# Patient Record
Sex: MALE | Race: WHITE | Employment: FULL TIME | URBAN - METROPOLITAN AREA
[De-identification: names, ages, dates, MRNs, and addresses within clinical notes are randomized per-mention and may not be internally consistent; named-entity substitution may affect disease eponyms.]

---

## 2017-05-30 DIAGNOSIS — J02.9 SORE THROAT: ICD-10-CM

## 2017-06-01 LAB
ORGANISM: ABNORMAL
THROAT CULTURE: ABNORMAL
THROAT CULTURE: ABNORMAL

## 2017-12-23 ENCOUNTER — OFFICE VISIT (OUTPATIENT)
Dept: PRIMARY CARE CLINIC | Age: 37
End: 2017-12-23

## 2017-12-23 VITALS
OXYGEN SATURATION: 99 % | TEMPERATURE: 97.3 F | WEIGHT: 212 LBS | BODY MASS INDEX: 26.36 KG/M2 | DIASTOLIC BLOOD PRESSURE: 72 MMHG | HEIGHT: 75 IN | SYSTOLIC BLOOD PRESSURE: 120 MMHG | RESPIRATION RATE: 16 BRPM | HEART RATE: 65 BPM

## 2017-12-23 DIAGNOSIS — F43.9 STRESS: ICD-10-CM

## 2017-12-23 DIAGNOSIS — E78.5 HYPERLIPIDEMIA, UNSPECIFIED HYPERLIPIDEMIA TYPE: ICD-10-CM

## 2017-12-23 DIAGNOSIS — F32.A DEPRESSION, UNSPECIFIED DEPRESSION TYPE: ICD-10-CM

## 2017-12-23 DIAGNOSIS — J30.1 CHRONIC SEASONAL ALLERGIC RHINITIS DUE TO POLLEN: Primary | ICD-10-CM

## 2017-12-23 DIAGNOSIS — R53.83 FATIGUE, UNSPECIFIED TYPE: ICD-10-CM

## 2017-12-23 LAB
ALBUMIN SERPL-MCNC: 4.5 G/DL (ref 3.9–4.9)
ALP BLD-CCNC: 57 U/L (ref 35–104)
ALT SERPL-CCNC: 15 U/L (ref 0–41)
ANION GAP SERPL CALCULATED.3IONS-SCNC: 12 MEQ/L (ref 7–13)
ANISOCYTOSIS: 0
AST SERPL-CCNC: 18 U/L (ref 0–40)
ATYPICAL LYMPHOCYTE RELATIVE PERCENT: 4 %
BANDED NEUTROPHILS RELATIVE PERCENT: 5 %
BASOPHILS ABSOLUTE: 0.1 K/UL (ref 0–0.2)
BASOPHILS RELATIVE PERCENT: 2 %
BILIRUB SERPL-MCNC: 0.6 MG/DL (ref 0–1.2)
BUN BLDV-MCNC: 11 MG/DL (ref 6–20)
CALCIUM SERPL-MCNC: 9.4 MG/DL (ref 8.6–10.2)
CHLORIDE BLD-SCNC: 100 MEQ/L (ref 98–107)
CHOLESTEROL, TOTAL: 192 MG/DL (ref 0–199)
CO2: 29 MEQ/L (ref 22–29)
CREAT SERPL-MCNC: 0.78 MG/DL (ref 0.7–1.2)
EOSINOPHILS ABSOLUTE: 0.1 K/UL (ref 0–0.7)
EOSINOPHILS RELATIVE PERCENT: 3 %
GFR AFRICAN AMERICAN: >60
GFR NON-AFRICAN AMERICAN: >60
GLOBULIN: 2.5 G/DL (ref 2.3–3.5)
GLUCOSE BLD-MCNC: 88 MG/DL (ref 74–109)
HCT VFR BLD CALC: 45.8 % (ref 42–52)
HDLC SERPL-MCNC: 79 MG/DL (ref 40–59)
HEMOGLOBIN: 14.9 G/DL (ref 14–18)
HYPOCHROMIA: 0
LDL CHOLESTEROL CALCULATED: 101 MG/DL (ref 0–129)
LYMPHOCYTES ABSOLUTE: 1.6 K/UL (ref 1–4.8)
LYMPHOCYTES RELATIVE PERCENT: 42 %
MACROCYTES: 0
MAGNESIUM: 2.4 MG/DL (ref 1.7–2.3)
MCH RBC QN AUTO: 30.3 PG (ref 27–31.3)
MCHC RBC AUTO-ENTMCNC: 32.4 % (ref 33–37)
MCV RBC AUTO: 93.4 FL (ref 80–100)
MICROCYTES: 0
MONOCYTES ABSOLUTE: 0.4 K/UL (ref 0.2–0.8)
MONOCYTES RELATIVE PERCENT: 12.3 %
NEUTROPHILS ABSOLUTE: 1.3 K/UL (ref 1.4–6.5)
NEUTROPHILS RELATIVE PERCENT: 32 %
NUCLEATED RED BLOOD CELLS: 1 /100 WBC
PDW BLD-RTO: 13.7 % (ref 11.5–14.5)
PLATELET # BLD: 223 K/UL (ref 130–400)
PLATELET SLIDE REVIEW: NORMAL
POIKILOCYTES: 0
POLYCHROMASIA: 0
POTASSIUM SERPL-SCNC: 4.7 MEQ/L (ref 3.5–5.1)
PROMYELOCYTES PERCENT: 1 %
RBC # BLD: 4.91 M/UL (ref 4.7–6.1)
SODIUM BLD-SCNC: 141 MEQ/L (ref 132–144)
TOTAL PROTEIN: 7 G/DL (ref 6.4–8.1)
TRIGL SERPL-MCNC: 61 MG/DL (ref 0–200)
TSH SERPL DL<=0.05 MIU/L-ACNC: 2.71 UIU/ML (ref 0.27–4.2)
VITAMIN D 25-HYDROXY: 36.2 NG/ML (ref 30–100)
WBC # BLD: 3.4 K/UL (ref 4.8–10.8)

## 2017-12-23 PROCEDURE — 99214 OFFICE O/P EST MOD 30 MIN: CPT | Performed by: FAMILY MEDICINE

## 2017-12-23 RX ORDER — ALPRAZOLAM 0.25 MG/1
0.25 TABLET ORAL 3 TIMES DAILY PRN
Qty: 30 TABLET | Refills: 0 | Status: SHIPPED | OUTPATIENT
Start: 2017-12-23 | End: 2018-01-23

## 2017-12-23 RX ORDER — CITALOPRAM 20 MG/1
TABLET ORAL
Qty: 180 TABLET | Refills: 1 | Status: SHIPPED | OUTPATIENT
Start: 2017-12-23 | End: 2018-02-19 | Stop reason: SDUPTHER

## 2017-12-23 ASSESSMENT — ENCOUNTER SYMPTOMS
SHORTNESS OF BREATH: 0
VOMITING: 0
COUGH: 0
DIARRHEA: 0
WHEEZING: 0
NAUSEA: 0
CONSTIPATION: 0
ABDOMINAL PAIN: 0
SORE THROAT: 0
BACK PAIN: 0

## 2017-12-23 ASSESSMENT — PATIENT HEALTH QUESTIONNAIRE - PHQ9
2. FEELING DOWN, DEPRESSED OR HOPELESS: 1
SUM OF ALL RESPONSES TO PHQ9 QUESTIONS 1 & 2: 2
SUM OF ALL RESPONSES TO PHQ QUESTIONS 1-9: 2
1. LITTLE INTEREST OR PLEASURE IN DOING THINGS: 1

## 2017-12-25 LAB — TESTOSTERONE TOTAL-MALE: 774 NG/DL (ref 300–1080)

## 2017-12-30 ASSESSMENT — ENCOUNTER SYMPTOMS
EYE DISCHARGE: 0
EYE ITCHING: 0

## 2018-01-23 ENCOUNTER — OFFICE VISIT (OUTPATIENT)
Dept: PRIMARY CARE CLINIC | Age: 38
End: 2018-01-23
Payer: COMMERCIAL

## 2018-01-23 VITALS
HEIGHT: 75 IN | HEART RATE: 68 BPM | RESPIRATION RATE: 14 BRPM | SYSTOLIC BLOOD PRESSURE: 116 MMHG | BODY MASS INDEX: 27.73 KG/M2 | WEIGHT: 223 LBS | TEMPERATURE: 96.8 F | DIASTOLIC BLOOD PRESSURE: 80 MMHG

## 2018-01-23 DIAGNOSIS — F33.9 RECURRENT DEPRESSION (HCC): Primary | ICD-10-CM

## 2018-01-23 DIAGNOSIS — F43.9 STRESS: ICD-10-CM

## 2018-01-23 DIAGNOSIS — F41.9 ANXIETY: ICD-10-CM

## 2018-01-23 DIAGNOSIS — J30.1 ACUTE SEASONAL ALLERGIC RHINITIS DUE TO POLLEN: ICD-10-CM

## 2018-01-23 PROCEDURE — 99213 OFFICE O/P EST LOW 20 MIN: CPT | Performed by: FAMILY MEDICINE

## 2018-01-23 RX ORDER — ALPRAZOLAM 0.25 MG/1
0.25 TABLET ORAL 3 TIMES DAILY PRN
Qty: 30 TABLET | Refills: 0 | Status: SHIPPED | OUTPATIENT
Start: 2018-01-23 | End: 2018-02-22

## 2018-01-23 RX ORDER — BUPROPION HYDROCHLORIDE 150 MG/1
150 TABLET ORAL EVERY MORNING
Qty: 30 TABLET | Refills: 1 | Status: SHIPPED | OUTPATIENT
Start: 2018-01-23 | End: 2018-02-22 | Stop reason: SDUPTHER

## 2018-01-23 ASSESSMENT — ENCOUNTER SYMPTOMS
CHEST TIGHTNESS: 0
SHORTNESS OF BREATH: 0

## 2018-01-23 NOTE — PROGRESS NOTES
my presence, and it is both accurate and complete.  Electronically signed by: Flory Crocker MD    1/23/18 11:35 PM    Flory Crocker MD

## 2018-02-19 DIAGNOSIS — F32.A DEPRESSION, UNSPECIFIED DEPRESSION TYPE: ICD-10-CM

## 2018-02-19 RX ORDER — CITALOPRAM 20 MG/1
TABLET ORAL
Qty: 180 TABLET | Refills: 1 | Status: SHIPPED | OUTPATIENT
Start: 2018-02-19 | End: 2018-05-23 | Stop reason: SDUPTHER

## 2018-02-22 ENCOUNTER — OFFICE VISIT (OUTPATIENT)
Dept: PRIMARY CARE CLINIC | Age: 38
End: 2018-02-22
Payer: COMMERCIAL

## 2018-02-22 VITALS
OXYGEN SATURATION: 97 % | BODY MASS INDEX: 26.98 KG/M2 | RESPIRATION RATE: 16 BRPM | SYSTOLIC BLOOD PRESSURE: 116 MMHG | HEIGHT: 75 IN | WEIGHT: 217 LBS | HEART RATE: 62 BPM | TEMPERATURE: 97.2 F | DIASTOLIC BLOOD PRESSURE: 70 MMHG

## 2018-02-22 DIAGNOSIS — F41.9 ANXIETY: ICD-10-CM

## 2018-02-22 DIAGNOSIS — F33.9 RECURRENT DEPRESSION (HCC): ICD-10-CM

## 2018-02-22 DIAGNOSIS — J30.1 ACUTE SEASONAL ALLERGIC RHINITIS DUE TO POLLEN: Primary | ICD-10-CM

## 2018-02-22 PROCEDURE — 99213 OFFICE O/P EST LOW 20 MIN: CPT | Performed by: FAMILY MEDICINE

## 2018-02-22 RX ORDER — BUPROPION HYDROCHLORIDE 150 MG/1
150 TABLET ORAL EVERY MORNING
Qty: 90 TABLET | Refills: 1 | Status: SHIPPED | OUTPATIENT
Start: 2018-02-22 | End: 2018-05-23 | Stop reason: SDUPTHER

## 2018-02-22 ASSESSMENT — ENCOUNTER SYMPTOMS
SHORTNESS OF BREATH: 0
ABDOMINAL PAIN: 0
WHEEZING: 0

## 2018-02-22 NOTE — PROGRESS NOTES
SIGNS: are as recorded. GENERAL:  The patient appears well nourished and well-developed, and with normal affect. No acute respiratory distress. Alert and oriented times 3. No skin rashes. HEENT:  TMs normal bilaterally. Canals and ears normal. Pharynx is clear. Extraocular eye motions intact and pain free. Pupils reactive and equally round. Sclerae and conjunctivae clear, normocephalic and atraumatic. RESPIRATORY:  Clear and equal breath sounds with no acute respiratory distress. HEART: Regular rhythm without murmur, rub or gallop. ABDOMEN:  soft, nontender. No masses, guarding or rebound. Normoactive bowel sounds. NECK: No masses or adenopathy palpable. No bruits heard. No asymmetry visible. LOW BACK: No tenderness to palpation of inferior lumbar spine or either sacroiliac joint area. Assessment:     1. Acute seasonal allergic rhinitis due to pollen     2. Recurrent depression (HCC)  buPROPion (WELLBUTRIN XL) 150 MG extended release tablet   3. Anxiety         Plan:      No orders of the defined types were placed in this encounter. Orders Placed This Encounter   Medications    buPROPion (WELLBUTRIN XL) 150 MG extended release tablet     Sig: Take 1 tablet by mouth every morning     Dispense:  90 tablet     Refill:  1       Controlled Substances Monitoring:      Return in about 3 months (around 5/22/2018). I, Danny HUI, am scribing for and in the presence of Nanci Pastor MD. Electronically signed by : Charlene Lyon MD, personally performed the services described in this documentation, as scribed by Danny HUI   in my presence, and it is both accurate and complete.  Electronically signed by: Nanci Pastor MD    2/22/18 8:27 AM    Nanci Pastor MD

## 2018-05-23 ENCOUNTER — OFFICE VISIT (OUTPATIENT)
Dept: PRIMARY CARE CLINIC | Age: 38
End: 2018-05-23
Payer: COMMERCIAL

## 2018-05-23 VITALS
WEIGHT: 222 LBS | SYSTOLIC BLOOD PRESSURE: 108 MMHG | HEIGHT: 75 IN | RESPIRATION RATE: 16 BRPM | DIASTOLIC BLOOD PRESSURE: 72 MMHG | OXYGEN SATURATION: 97 % | HEART RATE: 71 BPM | TEMPERATURE: 97.3 F | BODY MASS INDEX: 27.6 KG/M2

## 2018-05-23 DIAGNOSIS — F32.A DEPRESSION, UNSPECIFIED DEPRESSION TYPE: ICD-10-CM

## 2018-05-23 DIAGNOSIS — J30.1 ACUTE SEASONAL ALLERGIC RHINITIS DUE TO POLLEN: Primary | ICD-10-CM

## 2018-05-23 DIAGNOSIS — F33.9 RECURRENT DEPRESSION (HCC): ICD-10-CM

## 2018-05-23 DIAGNOSIS — F41.9 ANXIETY: ICD-10-CM

## 2018-05-23 PROCEDURE — 99214 OFFICE O/P EST MOD 30 MIN: CPT | Performed by: FAMILY MEDICINE

## 2018-05-23 PROCEDURE — 96372 THER/PROPH/DIAG INJ SC/IM: CPT | Performed by: FAMILY MEDICINE

## 2018-05-23 RX ORDER — FEXOFENADINE HCL 180 MG/1
180 TABLET ORAL DAILY
Qty: 90 TABLET | Refills: 1 | Status: SHIPPED | OUTPATIENT
Start: 2018-05-23 | End: 2018-12-13 | Stop reason: SDUPTHER

## 2018-05-23 RX ORDER — CITALOPRAM 20 MG/1
TABLET ORAL
Qty: 180 TABLET | Refills: 1 | Status: SHIPPED | OUTPATIENT
Start: 2018-05-23 | End: 2018-12-13 | Stop reason: SDUPTHER

## 2018-05-23 RX ORDER — TRIAMCINOLONE ACETONIDE 40 MG/ML
80 INJECTION, SUSPENSION INTRA-ARTICULAR; INTRAMUSCULAR ONCE
Status: COMPLETED | OUTPATIENT
Start: 2018-05-23 | End: 2018-05-23

## 2018-05-23 RX ORDER — BUPROPION HYDROCHLORIDE 150 MG/1
150 TABLET ORAL EVERY MORNING
Qty: 90 TABLET | Refills: 1 | Status: SHIPPED | OUTPATIENT
Start: 2018-05-23 | End: 2018-12-13 | Stop reason: SDUPTHER

## 2018-05-23 RX ADMIN — TRIAMCINOLONE ACETONIDE 80 MG: 40 INJECTION, SUSPENSION INTRA-ARTICULAR; INTRAMUSCULAR at 09:08

## 2018-05-23 ASSESSMENT — ENCOUNTER SYMPTOMS
TROUBLE SWALLOWING: 0
SHORTNESS OF BREATH: 0
COUGH: 0
CHEST TIGHTNESS: 0
SINUS PAIN: 1
SORE THROAT: 0
RHINORRHEA: 1

## 2018-12-04 DIAGNOSIS — J30.1 ACUTE SEASONAL ALLERGIC RHINITIS DUE TO POLLEN: ICD-10-CM

## 2018-12-04 RX ORDER — FEXOFENADINE HCL 180 MG/1
TABLET ORAL
Qty: 90 TABLET | Refills: 1 | OUTPATIENT
Start: 2018-12-04

## 2018-12-13 ENCOUNTER — OFFICE VISIT (OUTPATIENT)
Dept: PRIMARY CARE CLINIC | Age: 38
End: 2018-12-13
Payer: COMMERCIAL

## 2018-12-13 VITALS
WEIGHT: 210 LBS | RESPIRATION RATE: 16 BRPM | HEIGHT: 75 IN | DIASTOLIC BLOOD PRESSURE: 70 MMHG | SYSTOLIC BLOOD PRESSURE: 110 MMHG | TEMPERATURE: 97.7 F | BODY MASS INDEX: 26.11 KG/M2 | HEART RATE: 66 BPM

## 2018-12-13 DIAGNOSIS — R53.83 FATIGUE, UNSPECIFIED TYPE: ICD-10-CM

## 2018-12-13 DIAGNOSIS — F33.9 RECURRENT DEPRESSION (HCC): ICD-10-CM

## 2018-12-13 DIAGNOSIS — J30.1 ACUTE SEASONAL ALLERGIC RHINITIS DUE TO POLLEN: ICD-10-CM

## 2018-12-13 DIAGNOSIS — R53.83 OTHER FATIGUE: ICD-10-CM

## 2018-12-13 DIAGNOSIS — E78.5 HYPERLIPIDEMIA, UNSPECIFIED HYPERLIPIDEMIA TYPE: ICD-10-CM

## 2018-12-13 DIAGNOSIS — F41.9 ANXIETY: Primary | ICD-10-CM

## 2018-12-13 DIAGNOSIS — R79.89 LOW TESTOSTERONE IN MALE: ICD-10-CM

## 2018-12-13 DIAGNOSIS — F32.A DEPRESSION, UNSPECIFIED DEPRESSION TYPE: ICD-10-CM

## 2018-12-13 DIAGNOSIS — J30.81 ALLERGY TO CATS: ICD-10-CM

## 2018-12-13 LAB
ALBUMIN SERPL-MCNC: 4.4 G/DL (ref 3.9–4.9)
ALP BLD-CCNC: 60 U/L (ref 35–104)
ALT SERPL-CCNC: 16 U/L (ref 0–41)
ANION GAP SERPL CALCULATED.3IONS-SCNC: 10 MEQ/L (ref 7–13)
AST SERPL-CCNC: 22 U/L (ref 0–40)
BILIRUB SERPL-MCNC: 0.6 MG/DL (ref 0–1.2)
BUN BLDV-MCNC: 21 MG/DL (ref 6–20)
CALCIUM SERPL-MCNC: 9.9 MG/DL (ref 8.6–10.2)
CHLORIDE BLD-SCNC: 98 MEQ/L (ref 98–107)
CHOLESTEROL, TOTAL: 197 MG/DL (ref 0–199)
CO2: 29 MEQ/L (ref 22–29)
CREAT SERPL-MCNC: 0.87 MG/DL (ref 0.7–1.2)
GFR AFRICAN AMERICAN: >60
GFR NON-AFRICAN AMERICAN: >60
GLOBULIN: 3.2 G/DL (ref 2.3–3.5)
GLUCOSE BLD-MCNC: 80 MG/DL (ref 74–109)
HCT VFR BLD CALC: 46 % (ref 42–52)
HDLC SERPL-MCNC: 85 MG/DL (ref 40–59)
HEMOGLOBIN: 15.6 G/DL (ref 14–18)
LDL CHOLESTEROL CALCULATED: 101 MG/DL (ref 0–129)
MCH RBC QN AUTO: 31.6 PG (ref 27–31.3)
MCHC RBC AUTO-ENTMCNC: 34 % (ref 33–37)
MCV RBC AUTO: 93 FL (ref 80–100)
PDW BLD-RTO: 13.1 % (ref 11.5–14.5)
PLATELET # BLD: 242 K/UL (ref 130–400)
POTASSIUM SERPL-SCNC: 4.9 MEQ/L (ref 3.5–5.1)
RBC # BLD: 4.95 M/UL (ref 4.7–6.1)
SODIUM BLD-SCNC: 137 MEQ/L (ref 132–144)
TOTAL PROTEIN: 7.6 G/DL (ref 6.4–8.1)
TRIGL SERPL-MCNC: 55 MG/DL (ref 0–200)
WBC # BLD: 4.4 K/UL (ref 4.8–10.8)

## 2018-12-13 PROCEDURE — 99214 OFFICE O/P EST MOD 30 MIN: CPT | Performed by: FAMILY MEDICINE

## 2018-12-13 RX ORDER — CITALOPRAM 20 MG/1
TABLET ORAL
Qty: 180 TABLET | Refills: 1 | Status: SHIPPED | OUTPATIENT
Start: 2018-12-13

## 2018-12-13 RX ORDER — FEXOFENADINE HCL 180 MG/1
180 TABLET ORAL DAILY
Qty: 90 TABLET | Refills: 1 | Status: SHIPPED | OUTPATIENT
Start: 2018-12-13

## 2018-12-13 RX ORDER — BUPROPION HYDROCHLORIDE 150 MG/1
150 TABLET ORAL EVERY MORNING
Qty: 90 TABLET | Refills: 1 | Status: SHIPPED | OUTPATIENT
Start: 2018-12-13

## 2018-12-13 RX ORDER — CETIRIZINE HYDROCHLORIDE 10 MG/1
10 TABLET ORAL DAILY
Qty: 90 TABLET | Refills: 1 | Status: SHIPPED | OUTPATIENT
Start: 2018-12-13

## 2018-12-13 ASSESSMENT — ENCOUNTER SYMPTOMS: SHORTNESS OF BREATH: 0

## 2018-12-15 LAB
SEX HORMONE BINDING GLOBULIN: 71 NMOL/L (ref 11–80)
TESTOSTERONE FREE PERCENT: 1.2 % (ref 1.6–2.9)
TESTOSTERONE FREE, CALC: 70 PG/ML (ref 47–244)
TESTOSTERONE TOTAL-MALE: 594 NG/DL (ref 300–1080)

## 2018-12-20 ENCOUNTER — TELEPHONE (OUTPATIENT)
Dept: PRIMARY CARE CLINIC | Age: 38
End: 2018-12-20

## 2023-11-15 ENCOUNTER — ANCILLARY PROCEDURE (OUTPATIENT)
Dept: RADIOLOGY | Facility: CLINIC | Age: 43
End: 2023-11-15
Payer: COMMERCIAL

## 2023-11-15 ENCOUNTER — OFFICE VISIT (OUTPATIENT)
Dept: ORTHOPEDIC SURGERY | Facility: CLINIC | Age: 43
End: 2023-11-15
Payer: COMMERCIAL

## 2023-11-15 DIAGNOSIS — M25.511 RIGHT SHOULDER PAIN, UNSPECIFIED CHRONICITY: ICD-10-CM

## 2023-11-15 DIAGNOSIS — M75.21 BICEPS TENDINITIS OF RIGHT SHOULDER: ICD-10-CM

## 2023-11-15 PROCEDURE — 73030 X-RAY EXAM OF SHOULDER: CPT | Mod: RT

## 2023-11-15 PROCEDURE — 99214 OFFICE O/P EST MOD 30 MIN: CPT | Performed by: FAMILY MEDICINE

## 2023-11-15 PROCEDURE — 73030 X-RAY EXAM OF SHOULDER: CPT | Mod: RIGHT SIDE | Performed by: FAMILY MEDICINE

## 2023-11-15 RX ORDER — METHYLPREDNISOLONE 4 MG/1
TABLET ORAL
Qty: 21 TABLET | Refills: 0 | Status: SHIPPED | OUTPATIENT
Start: 2023-11-15

## 2023-11-15 RX ORDER — NAPROXEN 500 MG/1
500 TABLET ORAL 2 TIMES DAILY
Qty: 60 TABLET | Refills: 1 | Status: SHIPPED | OUTPATIENT
Start: 2023-11-15 | End: 2023-12-13 | Stop reason: SINTOL

## 2023-11-15 NOTE — PROGRESS NOTES
Acute Injury New Patient Visit    CC:   Chief Complaint   Patient presents with    Right Shoulder - Follow-up     Right shoulder pain xrays today       HPI: Ron is a 43 y.o.male who presents today with new complaints of anterior right shoulder pain x8 months or so.  Patient known to me for previous left shoulder injury presents here today for further evaluation of his right shoulder.  He points to the front of the bicep as area most pain discomfort he is a right-hand-dominant individual understands he should come in a long time ago however was just finally able to get around to being seen due to the persistent issues.  He denies any numbness tingling or burning states he has good range of motion but decree strength with forward pressing and activities up and above his head.        Review of Systems   GENERAL: Negative for malaise, significant weight loss, fever  MUSCULOSKELETAL: See HPI  NEURO: Negative for numbness / tingling     Past Medical History  Past Medical History:   Diagnosis Date    Encounter for immunization     Pneumococcal vaccine administered       Medication review  Medication Documentation Review Audit    **Prior to Admission medications have not yet been reviewed**         Allergies  No Known Allergies    Social History  Social History     Socioeconomic History    Marital status:      Spouse name: Not on file    Number of children: Not on file    Years of education: Not on file    Highest education level: Not on file   Occupational History    Not on file   Tobacco Use    Smoking status: Not on file    Smokeless tobacco: Not on file   Substance and Sexual Activity    Alcohol use: Not on file    Drug use: Not on file    Sexual activity: Not on file   Other Topics Concern    Not on file   Social History Narrative    Not on file     Social Determinants of Health     Financial Resource Strain: Not on file   Food Insecurity: Not on file   Transportation Needs: Not on file   Physical Activity:  Not on file   Stress: Not on file   Social Connections: Not on file   Intimate Partner Violence: Not on file   Housing Stability: Not on file       Surgical History  Past Surgical History:   Procedure Laterality Date    US ASPIRATION INJECTION INTERMEDIATE JOINT  12/7/2021    US ASPIRATION INJECTION INTERMEDIATE JOINT 12/7/2021 [facility]       Physical Exam:  GENERAL:  Patient is awake, alert, and oriented to person place and time.  Patient appears well nourished and well kept.  Affect Calm, Not Acutely Distressed.  HEENT:  Normocephalic, Atraumatic, EOMI  CARDIOVASCULAR:  Hemodynamically stable.  RESPIRATORY:  Normal respirations with unlabored breathing.  NEURO: Gross sensation intact to the upper extremities bilaterally.  Extremity: Right shoulder exam demonstrates tenderness palpation at the insertion of the biceps tendon he has a positive speeds and Yergason's Test.  He has a negative Neer's Tong and [place]'s test.  Negative crossarm test today.  He has 5 out of 5 strength with abduction normal internal and external rotation with slight 4 out of 5 strength with external rotation about the shoulder internal rotation is otherwise normal.  The remainder the right upper extremity is neurovascular intact and benign with good  strength.      Diagnostics: X-rays below as discussed with patient  XR shoulder right 2+ views          Interpreted By:  Budinsky, Cole,   STUDY:  XR SHOULDER RIGHT 2+ VIEWS;  ;  11/15/2023 11:29 am      INDICATION:  Signs/Symptoms:pain.      ACCESSION NUMBER(S):  [id number]      ORDERING CLINICIAN:  COLE BUDINSKY      FINDINGS:  Right shoulder films demonstrate no presence for acute fracture or  dislocation. Minimal osteoarthritic changes about the AC joint mild  elevation distal clavicle seen. Subtle shortening of the acromial  humeral index noted with very subtle downsloping acromion present.  Right shoulder films demonstrate no obvious acute osseous abnormality.           Signed by: Cole Budinsky 11/15/2023 12:53 PM  Dictation workstation:   [id number]             Procedure: None  Procedures    Assessment:   Problem List Items Addressed This Visit       Right shoulder pain    Relevant Medications    methylPREDNISolone (Medrol Dospak) 4 mg tablets    naproxen (Naprosyn) 500 mg tablet    Other Relevant Orders    XR shoulder right 2+ views (Completed)    Biceps tendinitis of right shoulder    Relevant Medications    methylPREDNISolone (Medrol Dospak) 4 mg tablets    naproxen (Naprosyn) 500 mg tablet    Other Relevant Orders    XR shoulder right 2+ views (Completed)        Plan: At this time we will hold off on injection offer him a oral steroid anti-inflammatory and home exercises.  He was encouraged to ice massage which he is done before with good success.  We will see him back in 3 to 4 weeks if worse or no better we will offer him a ultrasound injection to the anterior aspect of the shoulder likely in the biceps tendon sheath location.  If that injection fails to provide any lasting or significant relief we will consider further evaluation with an MRI.  No need for x-rays next visit.  Orders Placed This Encounter    XR shoulder right 2+ views    methylPREDNISolone (Medrol Dospak) 4 mg tablets    naproxen (Naprosyn) 500 mg tablet      At the conclusion of the visit there were no further questions by the patient/family regarding their plan of care.  Patient was instructed to call or return with any issues, questions, or concerns regarding their injury and/or treatment plan described above.     11/15/23 at 2:03 PM - Cole C Budinsky, MD    Office: [phone]    This note was prepared using voice recognition software.  The details of this note are correct and have been reviewed, and corrected to the best of my ability.  Some grammatical errors may persist related to the Dragon software.

## 2023-12-13 ENCOUNTER — OFFICE VISIT (OUTPATIENT)
Dept: ORTHOPEDIC SURGERY | Facility: CLINIC | Age: 43
End: 2023-12-13
Payer: COMMERCIAL

## 2023-12-13 DIAGNOSIS — M19.019 OSTEOARTHRITIS OF AC (ACROMIOCLAVICULAR) JOINT: ICD-10-CM

## 2023-12-13 DIAGNOSIS — M75.21 BICEPS TENDINITIS OF RIGHT SHOULDER: ICD-10-CM

## 2023-12-13 PROCEDURE — 2500000005 HC RX 250 GENERAL PHARMACY W/O HCPCS: Performed by: FAMILY MEDICINE

## 2023-12-13 PROCEDURE — 76942 ECHO GUIDE FOR BIOPSY: CPT | Performed by: FAMILY MEDICINE

## 2023-12-13 PROCEDURE — 20550 NJX 1 TENDON SHEATH/LIGAMENT: CPT | Performed by: FAMILY MEDICINE

## 2023-12-13 PROCEDURE — 2500000004 HC RX 250 GENERAL PHARMACY W/ HCPCS (ALT 636 FOR OP/ED): Performed by: FAMILY MEDICINE

## 2023-12-13 PROCEDURE — 99213 OFFICE O/P EST LOW 20 MIN: CPT | Performed by: FAMILY MEDICINE

## 2023-12-13 RX ORDER — BETAMETHASONE SODIUM PHOSPHATE AND BETAMETHASONE ACETATE 3; 3 MG/ML; MG/ML
12 INJECTION, SUSPENSION INTRA-ARTICULAR; INTRALESIONAL; INTRAMUSCULAR; SOFT TISSUE
Status: COMPLETED | OUTPATIENT
Start: 2023-12-13 | End: 2023-12-13

## 2023-12-13 RX ORDER — LIDOCAINE HYDROCHLORIDE 10 MG/ML
2 INJECTION INFILTRATION; PERINEURAL
Status: COMPLETED | OUTPATIENT
Start: 2023-12-13 | End: 2023-12-13

## 2023-12-13 RX ADMIN — LIDOCAINE HYDROCHLORIDE 2 ML: 10 INJECTION, SOLUTION INFILTRATION; PERINEURAL at 12:36

## 2023-12-13 RX ADMIN — BETAMETHASONE SODIUM PHOSPHATE AND BETAMETHASONE ACETATE 12 MG: 3; 3 INJECTION, SUSPENSION INTRA-ARTICULAR; INTRALESIONAL; INTRAMUSCULAR at 12:36

## 2023-12-13 NOTE — PROGRESS NOTES
Established Patient Follow-Up Visit    CC:   Chief Complaint   Patient presents with    Right Shoulder - Follow-up     Right shoulder pain ,Biceps tendinitis        HPI:  Ron is a 43 y.o. male returns here today for follow-up visit regarding: Right-sided anterior shoulder pain and discomfort.  He states that the anti-inflammatory really tore up his stomach he thought that the oral steroid did very good.  He would like to try an injection here today.          REVIEW OF SYSTEMS:  GENERAL: Negative for malaise, significant weight loss, fever  MUSCULOSKELETAL: See HPI  NEURO: Negative for numbness / tingling       PHYSICAL EXAM:  -Neuro: Gross sensation intact to the upper extremities bilaterally.  -Extremity: Right shoulder demonstrates tenderness palpation over the AC joint with mild crepitus he has a negative crossarm test however he has positive speeds and Yergason's Test.  Remainder the right upper extremity is neurovascular intact and benign with full range of motion and strength.    IMAGING: No new imaging today      PROCEDURE: US Guided right biceps Tendonsheath Injection:    Before aspiration/injection, the risks  of this procedure including but not limited to;  infection, local skin irritation, skin atrophy, calcification, continued pain or discomfort, elevated blood sugar, burning, failure to relieve pain, possible late infection were all discussed with the patient.  The patient verbalized understanding and consented to the procedure.     After informed consent was provided, patient identification was confirmed, and allergies were verified, the patient was appropriately positioned. The patient´s right biceps Tendon was identified in both the long and short axis via Ultrasound within the bicipital groove.    The site was marked and time-out performed.  The injection site was prepped in the usual sterile manner to provide a sterile environment. The skin was anesthetized with ethyl chloride spray. The  aspiration/injection was performed with standard technique. Using ultrasound guidance, with the LHBT visualized within the lateral aspect of the rotator interval, a 25G needle was advanced from a lateral to medial approach to a position adjacent to the tendon.      A 4 cc mixture of 2 cc's of Celestone and 2 cc´s of 1% lidocaine without epinephrine was injected into to the peritendinous sheath.  The needle was withdrawn and the puncture site was secured with a Band-Aid. The patient tolerated the procedure well without complication.      Standard post-procedure care was explained and return precautions were given prior to discharge.  Post-procedure discomfort can be alleviated with additional medication, ice, elevation, and rest over the first 24 hours as recommended.    Right biceps tendon sheath injection on 12/13/2023 12:36 PM  Medications: 2 mL lidocaine 10 mg/mL (1 %); 12 mg betamethasone acet,sod phos 6 mg/mL  Outcome: tolerated well, no immediate complications  Procedure, treatment alternatives, risks and benefits explained, specific risks discussed. Consent was given by the patient. Immediately prior to procedure a time out was called to verify the correct patient, procedure, equipment, support staff and site/side marked as required. Patient was prepped and draped in the usual sterile fashion.            ASSESSMENT:   Follow-up visit for:  Problem List Items Addressed This Visit       Biceps tendinitis of right shoulder    Relevant Orders    Point of Care Ultrasound (Completed)     Other Visit Diagnoses       Osteoarthritis of AC (acromioclavicular) joint        Relevant Orders    Point of Care Ultrasound (Completed)             PLAN: Patient received and tolerated the biceps tendon sheath injection well.  Had significant immediate relief upon discharge.  He will continue with his home exercises and activities as tolerated limiting himself to pain or discomfort and to back off as needed.  We will see him back  in 6 weeks for repeat evaluation no need for repeat x-rays at that time.  May consider AC joint injection or potential full glenohumeral injection should this injection not provide him any significant or lasting relief.  Orders Placed This Encounter    L Inj/Asp    Point of Care Ultrasound           At the conclusion of the visit there were no further questions by the patient/family regarding their plan of care.  Patient was instructed to call or return with any issues, questions, or concerns regarding their injury and/or treatment plan described above.     12/13/23 at 12:37 PM - Cole C Budinsky, MD    Office: [phone]    This note was prepared using voice recognition software.  The details of this note are correct and have been reviewed, and corrected to the best of my ability.  Some grammatical errors may persist related to the Dragon software.

## 2024-01-24 ENCOUNTER — OFFICE VISIT (OUTPATIENT)
Dept: ORTHOPEDIC SURGERY | Facility: CLINIC | Age: 44
End: 2024-01-24
Payer: COMMERCIAL

## 2024-01-24 DIAGNOSIS — M75.51 SUBACROMIAL BURSITIS OF RIGHT SHOULDER JOINT: ICD-10-CM

## 2024-01-24 PROCEDURE — 99213 OFFICE O/P EST LOW 20 MIN: CPT | Performed by: FAMILY MEDICINE

## 2024-01-24 PROCEDURE — 2500000005 HC RX 250 GENERAL PHARMACY W/O HCPCS: Performed by: FAMILY MEDICINE

## 2024-01-24 PROCEDURE — 20611 DRAIN/INJ JOINT/BURSA W/US: CPT | Mod: RT | Performed by: FAMILY MEDICINE

## 2024-01-24 PROCEDURE — 2500000004 HC RX 250 GENERAL PHARMACY W/ HCPCS (ALT 636 FOR OP/ED): Performed by: FAMILY MEDICINE

## 2024-01-24 RX ORDER — BETAMETHASONE SODIUM PHOSPHATE AND BETAMETHASONE ACETATE 3; 3 MG/ML; MG/ML
12 INJECTION, SUSPENSION INTRA-ARTICULAR; INTRALESIONAL; INTRAMUSCULAR; SOFT TISSUE
Status: COMPLETED | OUTPATIENT
Start: 2024-01-24 | End: 2024-01-24

## 2024-01-24 RX ORDER — LIDOCAINE HYDROCHLORIDE 10 MG/ML
6 INJECTION INFILTRATION; PERINEURAL
Status: COMPLETED | OUTPATIENT
Start: 2024-01-24 | End: 2024-01-24

## 2024-01-24 RX ADMIN — BETAMETHASONE SODIUM PHOSPHATE AND BETAMETHASONE ACETATE 12 MG: 3; 3 INJECTION, SUSPENSION INTRA-ARTICULAR; INTRALESIONAL; INTRAMUSCULAR at 10:49

## 2024-01-24 RX ADMIN — LIDOCAINE HYDROCHLORIDE 6 ML: 10 INJECTION, SOLUTION INFILTRATION; PERINEURAL at 10:49

## 2024-01-24 NOTE — PROGRESS NOTES
Established Patient Follow-Up Visit    CC:   Chief Complaint   Patient presents with    Right Shoulder - Follow-up     Biceps tendinitis  S/p usg consuelo inj 12/13/23  Re evaluate today       HPI:  Ron is a 43 y.o. male returns here today for follow-up visit regarding: Persistent pain discomfort to the lateral side of the right shoulder.  He states that he been having issues with sleeping and tossing and turning and waking up due to the discomfort.  The previous biceps tendon injection did very little if anything to help with his discomfort.  He is looking forward to upcoming baseball season and throwing outside with his kids.  He states while throwing snowball he had severe pain and even had to stop.          REVIEW OF SYSTEMS:  GENERAL: Negative for malaise, significant weight loss, fever  MUSCULOSKELETAL: See HPI  NEURO: Negative for numbness / tingling       PHYSICAL EXAM:  -Neuro: Gross sensation intact to the upper extremities bilaterally.  -Extremity: Right shoulder exam demonstrates full range of motion he has negative Neer's Tong and [place]'s.  Equivocal speeds and Yergason's Test.  Minimal AC joint arthritic tenderness.  More significant pain directly over the supraspinatus with 5 out of 5 strength globally about the shoulder.    IMAGING: No new imaging today  XR shoulder right 2+ views  Interpreted By:  Budinsky, Cole,   STUDY:  XR SHOULDER RIGHT 2+ VIEWS;  ;  11/15/2023 11:29 am      INDICATION:  Signs/Symptoms:pain.      ACCESSION NUMBER(S):  [id number]      ORDERING CLINICIAN:  COLE BUDINSKY      FINDINGS:  Right shoulder films demonstrate no presence for acute fracture or  dislocation. Minimal osteoarthritic changes about the AC joint mild  elevation distal clavicle seen. Subtle shortening of the acromial  humeral index noted with very subtle downsloping acromion present.  Right shoulder films demonstrate no obvious acute osseous abnormality.          Signed by: Cole Budinsky 11/15/2023 12:53  PM  Dictation workstation:   [id number]      PROCEDURE: Ultrasound Guided right subacromial bursitis Injection:    Before aspiration/injection, the risks  of this procedure including but not limited to;  infection, local skin irritation, skin atrophy, calcification, continued pain or discomfort, elevated blood sugar, burning, failure to relieve pain, possible late infection were all discussed with the patient.  The patient verbalized understanding and consented to the procedure.     After informed consent was provided, patient identification was confirmed, and allergies were verified, the patient was appropriately positioned. The patient's [ Right ] subacromial bursae was evaluated via ultrasound in long axis to identify the subacromial space.    The site was marked and time-out performed.  The injection site was prepped in the usual sterile manner to provide a sterile environment. The skin was anesthetized with ethyl chloride spray. The aspiration/injection was performed with standard technique. A 22G Spinal needle was passed through the skin into the lateral subacromial space under direct ultrasound guidance with sterile precautions in a lateral to medial approach. Next, [8] cc´s of injectate consisting of [2] cc´s of [ Celestone ] and [6] cc´s of 1% lidocaine without epinephrine was instilled into the bursal space.    The needle was withdrawn and the puncture site was secured with a Band-Aid. The patient tolerated the procedure well without complication.     Post-procedure discomfort can be alleviated with additional medication, ice, elevation, and rest over the first 24 hours as recommended.    L Inj/Asp: R subacromial bursa on 1/24/2024 10:49 AM  Indications: pain  Details: 22 G needle, ultrasound-guided superolateral approach  Medications: 6 mL lidocaine 10 mg/mL (1 %); 12 mg betamethasone acet,sod phos 6 mg/mL  Outcome: tolerated well, no immediate complications  Procedure, treatment alternatives, risks and  benefits explained, specific risks discussed. Consent was given by the patient. Immediately prior to procedure a time out was called to verify the correct patient, procedure, equipment, support staff and site/side marked as required. Patient was prepped and draped in the usual sterile fashion.            ASSESSMENT:   Follow-up visit for:  Problem List Items Addressed This Visit    None  Visit Diagnoses       Subacromial bursitis of right shoulder joint        Relevant Orders    Point of Care Ultrasound (Completed)             PLAN: Patient tolerated the injection well.  Had some mild immediate symptomatic relief.  Will see him back in 6 to 8 weeks for repeat evaluation if worse or no better we will consider going forward with an MRI of the shoulder for further evaluation.  We may also need to consider intra-articular shoulder injection going forward as well pending the results of the MRI.  He will continue with activities as tolerated.  Orders Placed This Encounter    L Inj/Asp: R subacromial bursa    Point of Care Ultrasound           At the conclusion of the visit there were no further questions by the patient/family regarding their plan of care.  Patient was instructed to call or return with any issues, questions, or concerns regarding their injury and/or treatment plan described above.     01/24/24 at 12:39 PM - Cole C Budinsky, MD    Office: [phone]    This note was prepared using voice recognition software.  The details of this note are correct and have been reviewed, and corrected to the best of my ability.  Some grammatical errors may persist related to the Dragon software.

## 2024-02-21 ENCOUNTER — OFFICE VISIT (OUTPATIENT)
Dept: ORTHOPEDIC SURGERY | Facility: CLINIC | Age: 44
End: 2024-02-21
Payer: COMMERCIAL

## 2024-02-21 DIAGNOSIS — M75.81 ROTATOR CUFF TENDINITIS, RIGHT: ICD-10-CM

## 2024-02-21 DIAGNOSIS — S43.431S LABRAL TEAR OF SHOULDER, RIGHT, SEQUELA: ICD-10-CM

## 2024-02-21 DIAGNOSIS — M19.019 OSTEOARTHRITIS OF AC (ACROMIOCLAVICULAR) JOINT: ICD-10-CM

## 2024-02-21 PROCEDURE — 99213 OFFICE O/P EST LOW 20 MIN: CPT | Performed by: FAMILY MEDICINE

## 2024-02-21 ASSESSMENT — PAIN SCALES - GENERAL: PAINLEVEL_OUTOF10: 8

## 2024-02-21 ASSESSMENT — PAIN - FUNCTIONAL ASSESSMENT: PAIN_FUNCTIONAL_ASSESSMENT: 0-10

## 2024-02-21 NOTE — PROGRESS NOTES
Established Patient Follow-Up Visit    CC:   Chief Complaint   Patient presents with    Right Shoulder - Follow-up     Subacromial bursitis   SP injection 1/24       HPI:  Ron is a 43 y.o. male returns here today for follow-up visit regarding: Persistent right shoulder pain.  He unfortunately has failed the biceps tendon sheath injection and the subacromial bursa injection he has significant pain discomfort still actually feeling worse now than he ever did before with the shoulder.  He denies any numbness tingling or burning he states it is waking him up.  He has inability to perform any chest press or overhead presses due to the worsening pain and discomfort.  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          REVIEW OF SYSTEMS:  GENERAL: Negative for malaise, significant weight loss, fever  MUSCULOSKELETAL: See HPI  NEURO: Negative for numbness / tingling       PHYSICAL EXAM:  -Neuro: Gross sensation intact to the upper extremities bilaterally.  -Extremity: Right shoulder exam: Patient with some mild crepitus he has no AC joint pain specifically today has pain anteriorly at the proximal insertion of the biceps a little bit of lateral subacromial bursa pain some crepitus noted.  He has full forward flexion with pain reproduced which is equal and symmetric bilaterally.  He has 4 out of 5 strength with resisted abduction supraspinatus testing and has a positive Neer's Tong and [place]'s.  He has an equivocal speeds and Yergason's Test.   strength equal symmetric and intact.  He has good internal rotation and good external rotation about the shoulder.  5 out of 5 internal and external rotation strength present.    IMAGING: No new imaging today  XR shoulder right 2+ views  Interpreted By:  Budinsky, Cole,   STUDY:  XR SHOULDER RIGHT 2+ VIEWS;  ;  11/15/2023 11:29 am      INDICATION:  Signs/Symptoms:pain.      ACCESSION  NUMBER(S):  [id number]      ORDERING CLINICIAN:  COLE BUDINSKY      FINDINGS:  Right shoulder films demonstrate no presence for acute fracture or  dislocation. Minimal osteoarthritic changes about the AC joint mild  elevation distal clavicle seen. Subtle shortening of the acromial  humeral index noted with very subtle downsloping acromion present.  Right shoulder films demonstrate no obvious acute osseous abnormality.          Signed by: Cole Budinsky 11/15/2023 12:53 PM  Dictation workstation:   [id number]      PROCEDURE: None today  Procedures     ASSESSMENT:   Follow-up visit for:  Problem List Items Addressed This Visit    None  Visit Diagnoses       Labral tear of shoulder, right, sequela        Relevant Orders    MR arthrogram shoulder right    XR arthrogram shoulder right    Rotator cuff tendinitis, right        Relevant Orders    MR arthrogram shoulder right    XR arthrogram shoulder right    Osteoarthritis of AC (acromioclavicular) joint        Relevant Orders    MR arthrogram shoulder right    XR arthrogram shoulder right             PLAN: At this time we will order patient a right shoulder MR arthrogram to evaluate for potential labral pathology in addition to rotator cuff tendinosis versus calcific tendinopathy and/or AC arthritic changes causing his significant pain.  We will see him back once the MRI is completed.  In the meantime I will review the results with surgical colleagues to determine most appropriate plan going forward.  Had lengthy discussion with the patient here today regarding options and he was agreeable with the plan going forward.  Orders Placed This Encounter    MR arthrogram shoulder right    XR arthrogram shoulder right           At the conclusion of the visit there were no further questions by the patient/family regarding their plan of care.  Patient was instructed to call or return with any issues, questions, or concerns regarding their injury and/or treatment plan described  above.     02/21/24 at 10:58 AM - Cole C Budinsky, MD    Office: [phone]    This note was prepared using voice recognition software.  The details of this note are correct and have been reviewed, and corrected to the best of my ability.  Some grammatical errors may persist related to the Dragon software.

## 2024-03-06 ENCOUNTER — APPOINTMENT (OUTPATIENT)
Dept: ORTHOPEDIC SURGERY | Facility: CLINIC | Age: 44
End: 2024-03-06
Payer: COMMERCIAL

## 2024-03-14 ENCOUNTER — HOSPITAL ENCOUNTER (OUTPATIENT)
Dept: RADIOLOGY | Facility: HOSPITAL | Age: 44
Discharge: HOME | End: 2024-03-14
Payer: COMMERCIAL

## 2024-03-14 DIAGNOSIS — M75.81 ROTATOR CUFF TENDINITIS, RIGHT: ICD-10-CM

## 2024-03-14 DIAGNOSIS — S43.431S LABRAL TEAR OF SHOULDER, RIGHT, SEQUELA: ICD-10-CM

## 2024-03-14 DIAGNOSIS — M19.019 OSTEOARTHRITIS OF AC (ACROMIOCLAVICULAR) JOINT: ICD-10-CM

## 2024-03-14 PROCEDURE — 73222 MRI JOINT UPR EXTREM W/DYE: CPT | Mod: RT

## 2024-03-14 PROCEDURE — 73222 MRI JOINT UPR EXTREM W/DYE: CPT | Mod: RIGHT SIDE | Performed by: STUDENT IN AN ORGANIZED HEALTH CARE EDUCATION/TRAINING PROGRAM

## 2024-03-14 PROCEDURE — A9575 INJ GADOTERATE MEGLUMI 0.1ML: HCPCS | Performed by: FAMILY MEDICINE

## 2024-03-14 PROCEDURE — 73040 CONTRAST X-RAY OF SHOULDER: CPT | Mod: RT

## 2024-03-14 PROCEDURE — 2550000001 HC RX 255 CONTRASTS: Performed by: FAMILY MEDICINE

## 2024-03-14 PROCEDURE — 2500000005 HC RX 250 GENERAL PHARMACY W/O HCPCS: Performed by: FAMILY MEDICINE

## 2024-03-14 PROCEDURE — 77002 NEEDLE LOCALIZATION BY XRAY: CPT | Mod: RIGHT SIDE | Performed by: RADIOLOGY

## 2024-03-14 PROCEDURE — 23350 INJECTION FOR SHOULDER X-RAY: CPT | Mod: RIGHT SIDE | Performed by: RADIOLOGY

## 2024-03-14 RX ORDER — LIDOCAINE HYDROCHLORIDE 20 MG/ML
INJECTION, SOLUTION EPIDURAL; INFILTRATION; INTRACAUDAL; PERINEURAL AS NEEDED
Status: COMPLETED | OUTPATIENT
Start: 2024-03-14 | End: 2024-03-14

## 2024-03-14 RX ORDER — SODIUM CHLORIDE 9 MG/ML
INJECTION INTRAMUSCULAR; INTRAVENOUS; SUBCUTANEOUS AS NEEDED
Status: COMPLETED | OUTPATIENT
Start: 2024-03-14 | End: 2024-03-14

## 2024-03-14 RX ORDER — GADOTERATE MEGLUMINE 376.9 MG/ML
INJECTION INTRAVENOUS AS NEEDED
Status: COMPLETED | OUTPATIENT
Start: 2024-03-14 | End: 2024-03-14

## 2024-03-14 RX ADMIN — IOHEXOL 3 ML: 300 INJECTION, SOLUTION INTRAVENOUS at 13:34

## 2024-03-14 RX ADMIN — LIDOCAINE HYDROCHLORIDE 7 ML: 20 INJECTION, SOLUTION EPIDURAL; INFILTRATION; INTRACAUDAL; PERINEURAL at 13:18

## 2024-03-14 RX ADMIN — GADOTERATE MEGLUMINE 0.1 ML: 376.9 INJECTION INTRAVENOUS at 13:17

## 2024-03-14 RX ADMIN — SODIUM CHLORIDE 10 ML: 9 INJECTION, SOLUTION INTRAMUSCULAR; INTRAVENOUS; SUBCUTANEOUS at 13:29

## 2024-03-18 ENCOUNTER — OFFICE VISIT (OUTPATIENT)
Dept: ORTHOPEDIC SURGERY | Facility: CLINIC | Age: 44
End: 2024-03-18
Payer: COMMERCIAL

## 2024-03-18 DIAGNOSIS — S43.431A LABRAL TEAR OF SHOULDER, RIGHT, INITIAL ENCOUNTER: ICD-10-CM

## 2024-03-18 PROCEDURE — 99213 OFFICE O/P EST LOW 20 MIN: CPT | Performed by: FAMILY MEDICINE

## 2024-03-18 RX ORDER — METHYLPREDNISOLONE 4 MG/1
TABLET ORAL
Qty: 21 TABLET | Refills: 0 | Status: SHIPPED | OUTPATIENT
Start: 2024-03-18

## 2024-03-18 NOTE — PROGRESS NOTES
Established Patient Follow-Up Visit    CC:   Chief Complaint   Patient presents with    Right Shoulder - Follow-up     Review MR arthrogram results       HPI:  Ron is a 43 y.o. male returns here today for follow-up visit regarding: Right shoulder pain MR arthrogram results.  Patient states he is quite painful and uncomfortable since having had the arthrogram about 4 days ago.  He is quite sore and having trouble sleeping          REVIEW OF SYSTEMS:  GENERAL: Negative for malaise, significant weight loss, fever  MUSCULOSKELETAL: See HPI  NEURO: Negative for numbness / tingling       PHYSICAL EXAM:  Patient awake alert oriented comfortably seated in the chair.  Right shoulder demonstrates limited range of motion with some reproduction of symptoms with forward flexion lateral abduction and internal and external rotation all with otherwise normal range of motion.    IMAGING: MRI reviewed as below with patient  MR arthrogram shoulder right  Narrative: Interpreted By:  Ilan Charles,   STUDY:  MR arthrogram of the right shoulder; 3/14/2024 2:00 pm      INDICATION:  Pain.      COMPARISON:  Radiographs 11/15/2023      ACCESSION NUMBER(S):  [id number]      ORDERING CLINICIAN:  COLE BUDINSKY      TECHNIQUE:  MR imaging of the right shoulder was obtained following the  intra-articular administration of dilute gadolinium contrast material.      FINDINGS:  ROTATOR CUFF TENDONS:  Small low-grade interstitial tear of the posterior supraspinatus  tendon measuring 0.7 cm in the anterior-posterior dimension. The  infraspinatus and teres minor tendons are intact.  No edema or fatty  atrophy of the associated rotator cuff musculature. Evaluation of the  subscapularis tendon and muscle is limited due to anterior  arthrographic approach, but no gross abnormality is present.      BICEPS TENDON AND ROTATOR INTERVAL:  The intra-articular long head biceps tendon is intact and has a  normal course. The rotator interval is  unremarkable.      JOINTS/LABRUM:  Contrast is present within the glenohumeral joint. There is no  evidence of bursal communication of contrast material to indicate  full-thickness rotator cuff tear.      Superior glenoid labral tear extending from the 12 o'clock to the 10  o'clock position. Additional glenoid labral tear in the  anteroinferior, inferior and posteroinferior labrum from 5 o'clock to  8 o'clock position, including a chondrolabral tear at the 7 o'clock  position. Evaluation of the glenohumeral articulation demonstrates no  articular cartilage defects.      The acromioclavicular joint demonstrates moderate osteoarthritis.      OSSEOUS STRUCTURES:  No focal marrow replacing lesions. No fracture.      SOFT TISSUES:  Contrast material is present within the anterior soft tissues of the  shoulder related to anterior arthrographic approach. No muscle  atrophy or tear.      Impression: Near circumferential glenoid labral tear.      Small low-grade interstitial tear in the posterior supraspinatus  tendon, 0.7 cm.      Moderate acromioclavicular osteoarthritis.      Signed by: Ilan Charles 3/14/2024 2:46 PM  Dictation workstation:   [id number]  XR arthrogram shoulder right  Narrative: Interpreted By:  Trenton Zuniga,   STUDY:  XR ARTHROGRAM SHOULDER RIGHT;  3/14/2024 1:32 pm      INDICATION:  Signs/Symptoms:pain.      COMPARISON:  None.      ACCESSION NUMBER(S):  [id number]      ORDERING CLINICIAN:  COLE BUDINSKY      FINDINGS:  The risks, benefits and alternatives of the procedure were discussed  with the patient. The patient was given the chance to ask questions,  and all were answered prior to proceeding. At this time, written  informed consent was obtained. Time out was performed prior to the  procedure to confirm patient identity and the appropriate procedure  site.      The patient was placed in the supine position on the fluoroscopic  table and was prepped and draped in the usual sterile fashion.  The  right shoulder joint was localized under fluoroscopy. Local  anesthesia was achieved with 2% lidocaine. Under fluoroscopic  guidance a 22 gauge needle was inserted into the right shoulder  joint. 3 ml of Omnipaque 300 contrast was injected to confirm  intraarticular needle placement with fluoroscopy.  10 mL dilute  gadolinium (0.1 millimoles gadolinium mixed with 20 mL normal saline)  were injected into the right shoulder joint.      The patient tolerated the procedure well and no immediate  complication was noted.      Fluoroscopy time: 19 seconds.      Total images:  1.      Impression: Successful fluoroscopic guided  right shoulder pre MRI gadolinium  arthrogram.              MACRO:  None.      Signed by: Trenton Zuniga 3/14/2024 1:42 PM  Dictation workstation:   [id number]      PROCEDURE: None  Procedures     ASSESSMENT:   Follow-up visit for:  Problem List Items Addressed This Visit    None  Visit Diagnoses       Labral tear of shoulder, right, initial encounter        Relevant Medications    methylPREDNISolone (Medrol Dospak) 4 mg tablets             PLAN: At this time we will offer the patient a follow-up in 3 to 4 weeks for intra-articular shoulder injection.  Patient was agreeable to the plan.  We will see him back for the injection.  In the meantime we will offer him an oral steroid pack.  He should call or return with any issues going forward.  We will have him follow-up with one of my surgical colleagues few weeks after the injection to determine shot efficacy and to discuss potential for labral repair.  Orders Placed This Encounter    methylPREDNISolone (Medrol Dospak) 4 mg tablets           At the conclusion of the visit there were no further questions by the patient/family regarding their plan of care.  Patient was instructed to call or return with any issues, questions, or concerns regarding their injury and/or treatment plan described above.     03/18/24 at 9:23 AM - Cole C Budinsky,  MD    Office: [phone]    This note was prepared using voice recognition software.  The details of this note are correct and have been reviewed, and corrected to the best of my ability.  Some grammatical errors may persist related to the Dragon software.

## 2024-04-15 ENCOUNTER — OFFICE VISIT (OUTPATIENT)
Dept: ORTHOPEDIC SURGERY | Facility: CLINIC | Age: 44
End: 2024-04-15
Payer: COMMERCIAL

## 2024-04-15 ENCOUNTER — HOSPITAL ENCOUNTER (OUTPATIENT)
Dept: RADIOLOGY | Facility: EXTERNAL LOCATION | Age: 44
Discharge: HOME | End: 2024-04-15

## 2024-04-15 DIAGNOSIS — M75.81 ROTATOR CUFF TENDINITIS, RIGHT: ICD-10-CM

## 2024-04-15 DIAGNOSIS — S43.431A LABRAL TEAR OF SHOULDER, RIGHT, INITIAL ENCOUNTER: Primary | ICD-10-CM

## 2024-04-15 PROCEDURE — 20611 DRAIN/INJ JOINT/BURSA W/US: CPT | Performed by: FAMILY MEDICINE

## 2024-04-15 PROCEDURE — 99213 OFFICE O/P EST LOW 20 MIN: CPT | Performed by: FAMILY MEDICINE

## 2024-04-15 RX ORDER — LIDOCAINE HYDROCHLORIDE 10 MG/ML
6 INJECTION INFILTRATION; PERINEURAL
Status: COMPLETED | OUTPATIENT
Start: 2024-04-15 | End: 2024-04-15

## 2024-04-15 RX ORDER — TRIAMCINOLONE ACETONIDE 40 MG/ML
40 INJECTION, SUSPENSION INTRA-ARTICULAR; INTRAMUSCULAR
Status: COMPLETED | OUTPATIENT
Start: 2024-04-15 | End: 2024-04-15

## 2024-04-15 RX ADMIN — LIDOCAINE HYDROCHLORIDE 6 ML: 10 INJECTION INFILTRATION; PERINEURAL at 14:36

## 2024-04-15 RX ADMIN — TRIAMCINOLONE ACETONIDE 40 MG: 40 INJECTION, SUSPENSION INTRA-ARTICULAR; INTRAMUSCULAR at 14:36

## 2024-04-15 NOTE — PROGRESS NOTES
Established Patient Follow-Up Visit    CC:   Chief Complaint   Patient presents with    Right Shoulder - Injections     Here for USG cortisone injection today       HPI:  Ron is a 43 y.o. male returns here today for follow-up visit regarding: Ultrasound-guided right shoulder injection.          REVIEW OF SYSTEMS:  GENERAL: Negative for malaise, significant weight loss, fever  MUSCULOSKELETAL: See HPI  NEURO: Negative for numbness / tingling       PHYSICAL EXAM:  -Neuro: Gross sensation intact to the upper extremities bilaterally.  -Extremity: Right shoulder demonstrates improved range of motion postinjection with forward flexion to 135 degrees lateral abduction to 90 normal internal and external rotation.  Distal pulses and sensation are intact.  The posterior aspect of the shoulder is warm pink well-perfused with no open cuts wounds or sores.    IMAGING: No new imaging today  Point of Care Ultrasound  These images are not reportable by radiology and will not be interpreted   by  Radiologists.      PROCEDURE: US Guided right intra-articular Shoulder Injection:    Before aspiration/injection, the risks  of this procedure including but not limited to;  infection, local skin irritation, skin atrophy, calcification, continued pain or discomfort, elevated blood sugar, burning, failure to relieve pain, possible late infection were all discussed with the patient.  The patient verbalized understanding and consented to the procedure.     After informed consent was provided, patient identification was confirmed, and allergies were verified, the patient was appropriately positioned. The patient´s [right] shoulder was evaluated via ultrasound to identify the posterior glenohumeral joint space.    The site was marked and time-out performed.  The injection site was prepped in the usual sterile manner to provide a sterile environment. The skin was anesthetized with ethyl chloride spray. The aspiration/injection was performed  with standard technique with a 22G spinal needle passed through the skin into the joint space under direct ultrasound guidance via sterile precautions. Next, [7] cc´s of injectate consisting of [1] cc´s of [Kenalog] and [6] cc´s of 1% lidocaine without epi was instilled into the joint space.      The needle was withdrawn and the puncture site was secured with a Band-Aid. The patient tolerated the procedure well without complication.     Post-procedure discomfort can be alleviated with additional medication, ice, elevation, and rest over the first 24 hours as recommended.      L Inj/Asp: R glenohumeral on 4/15/2024 2:36 PM  Indications: pain  Details: 22 G needle, ultrasound-guided posterior approach  Medications: 6 mL lidocaine 10 mg/mL (1 %); 40 mg triamcinolone acetonide 40 mg/mL  Outcome: tolerated well, no immediate complications  Procedure, treatment alternatives, risks and benefits explained, specific risks discussed. Consent was given by the patient. Immediately prior to procedure a time out was called to verify the correct patient, procedure, equipment, support staff and site/side marked as required. Patient was prepped and draped in the usual sterile fashion.            ASSESSMENT:   Follow-up visit for:  Problem List Items Addressed This Visit    None  Visit Diagnoses       Labral tear of shoulder, right, initial encounter    -  Primary    Relevant Orders    Point of Care Ultrasound (Completed)    Rotator cuff tendinitis, right                 PLAN: Patient tolerated the injection well we will plan to see him back as needed going forward in my clinic.  Patient will follow-up in 4 to 6 weeks with one of our sports shoulder specialist in the [facility] office for follow-up evaluation and further discussion of potential surgical management going forward.  Orders Placed This Encounter    L Inj/Asp    Point of Care Ultrasound           At the conclusion of the visit there were no further questions by the  patient/family regarding their plan of care.  Patient was instructed to call or return with any issues, questions, or concerns regarding their injury and/or treatment plan described above.     04/15/24 at 2:51 PM - Cole C Budinsky, MD    Office: [phone]    This note was prepared using voice recognition software.  The details of this note are correct and have been reviewed, and corrected to the best of my ability.  Some grammatical errors may persist related to the Dragon software.

## 2024-05-22 ENCOUNTER — OFFICE VISIT (OUTPATIENT)
Dept: ORTHOPEDIC SURGERY | Facility: CLINIC | Age: 44
End: 2024-05-22
Payer: COMMERCIAL

## 2024-05-22 DIAGNOSIS — M75.51 SUBACROMIAL BURSITIS OF RIGHT SHOULDER JOINT: Primary | ICD-10-CM

## 2024-05-22 PROCEDURE — 99213 OFFICE O/P EST LOW 20 MIN: CPT | Mod: 57 | Performed by: ORTHOPAEDIC SURGERY

## 2024-05-22 PROCEDURE — 99213 OFFICE O/P EST LOW 20 MIN: CPT | Performed by: ORTHOPAEDIC SURGERY

## 2024-05-22 PROCEDURE — 1036F TOBACCO NON-USER: CPT | Performed by: ORTHOPAEDIC SURGERY

## 2024-05-22 NOTE — PROGRESS NOTES
History of Present Illness:   Patient presents today for MRI review of the right shoulder after a series of injuries and pains been going on for several years.  He has been seeing Dr. Budinsky for previous evaluation as well as corticosteroid injections that have helped him.  Patient would like to discuss options for surgery as he understands he has a circumferential labrum tear that we will need addressing surgically.    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  He works from home.    Review of Systems   GENERAL: Negative for malaise, significant weight loss, fever  MUSCULOSKELETAL: see HPI  NEURO:  Negative    Physical Examination:  Right shoulder:  Skin healthy to gross inspection  No ecchymosis, no edema, no gross atrophy  No tenderness to palpation over acromioclavicular joint  Moderate tenderness to palpation over biceps tendon  No tenderness to palpation over the cervical spine      ROM:  Full forward flexion  Full external rotation  IR symmetric to contralateral side pain with internal rotation,   Strength:  5/5 Resisted elevation  5/5 Resisted external rotation     Negative lift off test   Negative Spurling´s test  Negative Neer and Hawking´s test  Positive apprehension at side  Positive relocation test  Pain along the anterior shoulder with palpation.  Negative Speed's test  Neurovascular exam normal distally    Imaging:  MRI of the right shoulder reveals circumferential labrum tearing without evidence of Hill-Sachs deformity or Bankart lesion.  There is mild rotator cuff tendinosis without tear.    Assessment:   Patient with circumferential right shoulder labrum tear    Plan:  We discussed labrum tears with the patient as well as arthroscopic labral repair versus capsulorrhaphy.  Patient  understands that he is trending towards this and is agreeable to this treatment plan.  He will follow-up in July to receive intra-articular corticosteroid injection, followed by surgical discussion later on in the fall 2024.    Rayray Devries PA-C    In a face to face encounter, I evaluated the patient and performed a physical examination, discussed pertinent diagnostic studies if indicated and discussed diagnosis and management strategies with both the patient and physician assistant / nurse practitioner.  I reviewed the PA/NP's note and agree with the documented findings and plan of care.        Arjun Murphy MD

## 2024-06-27 ENCOUNTER — LAB (OUTPATIENT)
Dept: LAB | Facility: LAB | Age: 44
End: 2024-06-27
Payer: COMMERCIAL

## 2024-06-27 DIAGNOSIS — R53.82 CHRONIC FATIGUE, UNSPECIFIED: ICD-10-CM

## 2024-06-27 DIAGNOSIS — E23.6 OTHER DISORDERS OF PITUITARY GLAND (MULTI): Primary | ICD-10-CM

## 2024-06-27 DIAGNOSIS — E29.1 TESTICULAR HYPOFUNCTION: ICD-10-CM

## 2024-06-27 LAB
ALBUMIN SERPL BCP-MCNC: 4.4 G/DL (ref 3.4–5)
ALP SERPL-CCNC: 48 U/L (ref 33–120)
ALT SERPL W P-5'-P-CCNC: 12 U/L (ref 10–52)
ANION GAP SERPL CALC-SCNC: 10 MMOL/L (ref 10–20)
AST SERPL W P-5'-P-CCNC: 15 U/L (ref 9–39)
BILIRUB SERPL-MCNC: 1.1 MG/DL (ref 0–1.2)
BUN SERPL-MCNC: 17 MG/DL (ref 6–23)
CALCIUM SERPL-MCNC: 9.2 MG/DL (ref 8.6–10.3)
CHLORIDE SERPL-SCNC: 104 MMOL/L (ref 98–107)
CO2 SERPL-SCNC: 31 MMOL/L (ref 21–32)
CORTIS AM PEAK SERPL-MSCNC: 13 UG/DL (ref 5–20)
CREAT SERPL-MCNC: 0.98 MG/DL (ref 0.5–1.3)
EGFRCR SERPLBLD CKD-EPI 2021: >90 ML/MIN/1.73M*2
ERYTHROCYTE [DISTWIDTH] IN BLOOD BY AUTOMATED COUNT: 12.2 % (ref 11.5–14.5)
ESTRADIOL SERPL-MCNC: 34 PG/ML
FSH SERPL-ACNC: <0.9 IU/L
GLUCOSE SERPL-MCNC: 84 MG/DL (ref 74–99)
HCT VFR BLD AUTO: 44.9 % (ref 41–52)
HGB BLD-MCNC: 15.2 G/DL (ref 13.5–17.5)
LH SERPL-ACNC: 0.1 IU/L
MCH RBC QN AUTO: 31.4 PG (ref 26–34)
MCHC RBC AUTO-ENTMCNC: 33.9 G/DL (ref 32–36)
MCV RBC AUTO: 93 FL (ref 80–100)
NRBC BLD-RTO: 0 /100 WBCS (ref 0–0)
PLATELET # BLD AUTO: 251 X10*3/UL (ref 150–450)
POTASSIUM SERPL-SCNC: 4.2 MMOL/L (ref 3.5–5.3)
PROLACTIN SERPL-MCNC: 8.8 UG/L (ref 2–18)
PROT SERPL-MCNC: 6.9 G/DL (ref 6.4–8.2)
PSA SERPL-MCNC: 0.8 NG/ML
RBC # BLD AUTO: 4.84 X10*6/UL (ref 4.5–5.9)
SODIUM SERPL-SCNC: 141 MMOL/L (ref 136–145)
T4 FREE SERPL-MCNC: 1.12 NG/DL (ref 0.61–1.12)
TSH SERPL-ACNC: 2.97 MIU/L (ref 0.44–3.98)
WBC # BLD AUTO: 4.8 X10*3/UL (ref 4.4–11.3)

## 2024-06-27 PROCEDURE — 82670 ASSAY OF TOTAL ESTRADIOL: CPT

## 2024-06-27 PROCEDURE — 83002 ASSAY OF GONADOTROPIN (LH): CPT

## 2024-06-27 PROCEDURE — 84146 ASSAY OF PROLACTIN: CPT

## 2024-06-27 PROCEDURE — 85027 COMPLETE CBC AUTOMATED: CPT

## 2024-06-27 PROCEDURE — 84305 ASSAY OF SOMATOMEDIN: CPT

## 2024-06-27 PROCEDURE — 83001 ASSAY OF GONADOTROPIN (FSH): CPT

## 2024-06-27 PROCEDURE — 36415 COLL VENOUS BLD VENIPUNCTURE: CPT

## 2024-06-27 PROCEDURE — 84153 ASSAY OF PSA TOTAL: CPT

## 2024-06-27 PROCEDURE — 82024 ASSAY OF ACTH: CPT

## 2024-06-27 PROCEDURE — 84439 ASSAY OF FREE THYROXINE: CPT

## 2024-06-27 PROCEDURE — 80053 COMPREHEN METABOLIC PANEL: CPT

## 2024-06-27 PROCEDURE — 84402 ASSAY OF FREE TESTOSTERONE: CPT

## 2024-06-27 PROCEDURE — 82533 TOTAL CORTISOL: CPT

## 2024-06-27 PROCEDURE — 84443 ASSAY THYROID STIM HORMONE: CPT

## 2024-06-29 LAB
ACTH PLAS-MCNC: 34 PG/ML (ref 7.2–63.3)
IGF-I SERPL-MCNC: 150 NG/ML (ref 81–236)
IGF-I Z-SCORE SERPL: 0

## 2024-07-02 LAB
TESTOSTERONE FREE (CHAN): 96.7 PG/ML (ref 35–155)
TESTOSTERONE,TOTAL,LC-MS/MS: 593 NG/DL (ref 250–1100)

## 2024-09-04 ENCOUNTER — APPOINTMENT (OUTPATIENT)
Dept: CARDIOLOGY | Facility: CLINIC | Age: 44
End: 2024-09-04
Payer: COMMERCIAL

## 2024-09-04 DIAGNOSIS — Z01.818 PREOP TESTING: ICD-10-CM

## 2024-09-04 PROCEDURE — 93000 ELECTROCARDIOGRAM COMPLETE: CPT | Performed by: INTERNAL MEDICINE

## 2024-09-04 NOTE — PROGRESS NOTES
Patient was seen today for an EKG visit. Patient is scheduled to have surgery done on shoulder by Dr. Murphy.

## 2024-09-06 DIAGNOSIS — E29.1 TESTICULAR HYPOFUNCTION: Primary | ICD-10-CM

## 2024-09-06 DIAGNOSIS — R53.82 CHRONIC FATIGUE, UNSPECIFIED: ICD-10-CM

## 2024-09-12 ENCOUNTER — OFFICE VISIT (OUTPATIENT)
Dept: ORTHOPEDIC SURGERY | Facility: CLINIC | Age: 44
End: 2024-09-12
Payer: COMMERCIAL

## 2024-09-12 DIAGNOSIS — S43.431A LABRAL TEAR OF SHOULDER, RIGHT, INITIAL ENCOUNTER: Primary | ICD-10-CM

## 2024-09-12 PROCEDURE — 99214 OFFICE O/P EST MOD 30 MIN: CPT | Performed by: ORTHOPAEDIC SURGERY

## 2024-09-12 PROCEDURE — 1036F TOBACCO NON-USER: CPT | Performed by: ORTHOPAEDIC SURGERY

## 2024-09-12 PROCEDURE — 99214 OFFICE O/P EST MOD 30 MIN: CPT | Mod: 57 | Performed by: ORTHOPAEDIC SURGERY

## 2024-09-12 PROCEDURE — L3670 SO ACRO/CLAV CAN WEB PRE OTS: HCPCS | Performed by: ORTHOPAEDIC SURGERY

## 2024-09-12 NOTE — PROGRESS NOTES
History of Present Illness:   Patient presents today for preoperative evaluation of his right shoulder for upcoming circumferential labral repair.  He has several questions today he would like to go over that pertain to the surgery and recovery process.  Overall he does feel prepared for this operation and recovery.  Denies any significant medication allergies or previous issues with anesthesia or anesthetic.    Review of Systems   GENERAL: Negative for malaise, significant weight loss, fever  MUSCULOSKELETAL: see HPI  NEURO:  Negative    Physical Examination:  Right shoulder:  Skin healthy to gross inspection  No ecchymosis, no edema, no gross atrophy  No tenderness to palpation over acromioclavicular joint  Moderate tenderness to palpation over biceps tendon  No tenderness to palpation over the cervical spine      ROM:  Full forward flexion  Full external rotation  IR symmetric to contralateral side pain with internal rotation,   Strength:  5/5 Resisted elevation  5/5 Resisted external rotation     Negative lift off test   Negative Spurling´s test  Negative Neer and Hawking´s test  Positive apprehension at side  Positive relocation test  Pain along the anterior shoulder with palpation.  Negative Speed's test  Neurovascular exam normal distally    Imaging:  MRI of the right shoulder reveals circumferential labrum tearing without evidence of Hill-Sachs deformity or Bankart lesion. There is mild rotator cuff tendinosis without tear.     Assessment:   Patient with circumferential right shoulder labrum tear     Plan:  We reviewed a variety of treatment option for the patient and family described upcoming right shoulder arthroscopic circumferential labral repair.  Patient agreeable like to proceed with this.  We will get his postop sling today. Also discussed PT.     Rayray Devries PA-C    In a face to face encounter, I evaluated the patient and performed a physical examination, discussed pertinent diagnostic  studies if indicated and discussed diagnosis and management strategies with both the patient and physician assistant / nurse practitioner.  I reviewed the PA/NP's note and agree with the documented findings and plan of care.    Patient with circumferential labral tearing discussed the majority of the tear is anterior more degenerative posteriorly.  We reviewed the surgery risks, benefits anticipated recovery.  We discussed the possibility of stiffness and incomplete relief of pain as well as associated interventions.  All of his questions were answered.    Arjun Murphy MD

## 2024-09-12 NOTE — H&P
History Of Present Illness  Ron Clarke is a 44 y.o. male presenting with right shoulder pain.     Past Medical History  He has a past medical history of Encounter for immunization.    Surgical History  He has a past surgical history that includes US aspiration injection intermediate joint (12/7/2021).     Social History  He reports that he has never smoked. He has never been exposed to tobacco smoke. He has never used smokeless tobacco. No history on file for alcohol use and drug use.    Family History  No family history on file.     Allergies  Patient has no known allergies.    Review of Systems CONSTITUTIONAL: Denies weight loss, fever and chills.    - HEENT: Denies changes in vision and hearing.    - RESPIRATORY: Denies SOB and cough.    - CV: Denies palpitations and CP.    - GI: Denies abdominal pain, nausea, vomiting and diarrhea.    - : Denies dysuria and urinary frequency.    - MSK: See physical exam findings     - SKIN: Denies rash and pruritus.    - NEUROLOGICAL: Denies headache and syncope.    - PSYCHIATRIC: Denies recent changes in mood. Denies anxiety and depression.     Physical Exam- GENERAL: Alert and oriented x 3. No acute distress. Well-nourished.    - EYES: EOMI. Anicteric.    - HENT: Moist mucous membranes. No scleral icterus. No cervical lymphadenopathy.    - LUNGS: Clear to auscultation bilaterally. No accessory muscle use.    - CARDIOVASCULAR: Regular rate and rhythm. No murmur. No JVD.    - ABDOMEN: Soft, non-tender and non-distended. No palpable masses.    - EXTREMITIES: Positive Bingham's test, mild loss of forward flexion.    - NEUROLOGIC: No focal neurological deficits. CN II-XII grossly intact, but not individually tested.    - PSYCHIATRIC: Cooperative. Appropriate mood and affect.     Last Recorded Vitals  There were no vitals taken for this visit.    Relevant Results      Scheduled medications    Continuous medications    PRN medications    No results found for this or any previous  visit (from the past 24 hour(s)).    Assessment/Plan   Diagnoses and all orders for this visit:  Labral tear of shoulder, right, initial encounter  -     Referral to Physical Therapy; Future  -     Shoulder Sling w/ Abduction Pillow    Discussed right shoulder labral repair, patient agreeable and would like to proceed.       I spent 10 minutes in the professional and overall care of this patient.      Rayray Devries PA-C

## 2024-10-03 DIAGNOSIS — G89.18 POST-OP PAIN: Primary | ICD-10-CM

## 2024-10-03 RX ORDER — OXYCODONE AND ACETAMINOPHEN 5; 325 MG/1; MG/1
1 TABLET ORAL EVERY 6 HOURS PRN
Qty: 28 TABLET | Refills: 0 | Status: SHIPPED | OUTPATIENT
Start: 2024-10-03 | End: 2024-10-04

## 2024-10-03 RX ORDER — ASPIRIN 81 MG/1
81 TABLET ORAL 2 TIMES DAILY
Qty: 28 TABLET | Refills: 0 | Status: SHIPPED | OUTPATIENT
Start: 2024-10-03 | End: 2024-10-17

## 2024-10-04 DIAGNOSIS — G89.18 POST-OPERATIVE PAIN: Primary | ICD-10-CM

## 2024-10-04 PROCEDURE — 29826 SHO ARTHRS SRG DECOMPRESSION: CPT | Performed by: STUDENT IN AN ORGANIZED HEALTH CARE EDUCATION/TRAINING PROGRAM

## 2024-10-04 PROCEDURE — 29826 SHO ARTHRS SRG DECOMPRESSION: CPT | Performed by: ORTHOPAEDIC SURGERY

## 2024-10-04 PROCEDURE — 29824 SHO ARTHRS SRG DSTL CLAVICLC: CPT | Performed by: STUDENT IN AN ORGANIZED HEALTH CARE EDUCATION/TRAINING PROGRAM

## 2024-10-04 PROCEDURE — 29806 SHO ARTHRS SRG CAPSULORRAPHY: CPT | Performed by: ORTHOPAEDIC SURGERY

## 2024-10-04 PROCEDURE — 29824 SHO ARTHRS SRG DSTL CLAVICLC: CPT | Performed by: ORTHOPAEDIC SURGERY

## 2024-10-04 RX ORDER — OXYCODONE AND ACETAMINOPHEN 5; 325 MG/1; MG/1
1 TABLET ORAL EVERY 4 HOURS PRN
Qty: 28 TABLET | Refills: 0 | Status: SHIPPED | OUTPATIENT
Start: 2024-10-04 | End: 2024-10-11

## 2024-10-16 ENCOUNTER — EVALUATION (OUTPATIENT)
Dept: PHYSICAL THERAPY | Facility: CLINIC | Age: 44
End: 2024-10-16
Payer: COMMERCIAL

## 2024-10-16 DIAGNOSIS — M25.511 CHRONIC RIGHT SHOULDER PAIN: Primary | ICD-10-CM

## 2024-10-16 DIAGNOSIS — R29.898 WEAKNESS OF RIGHT SHOULDER: ICD-10-CM

## 2024-10-16 DIAGNOSIS — S43.431D SUPERIOR GLENOID LABRUM LESION OF RIGHT SHOULDER, SUBSEQUENT ENCOUNTER: ICD-10-CM

## 2024-10-16 DIAGNOSIS — G89.29 CHRONIC RIGHT SHOULDER PAIN: Primary | ICD-10-CM

## 2024-10-16 DIAGNOSIS — M25.611 DECREASED RANGE OF MOTION OF RIGHT SHOULDER: ICD-10-CM

## 2024-10-16 PROBLEM — S43.431A SUPERIOR GLENOID LABRUM LESION OF RIGHT SHOULDER: Status: ACTIVE | Noted: 2024-10-16

## 2024-10-16 PROCEDURE — 97162 PT EVAL MOD COMPLEX 30 MIN: CPT | Mod: GP | Performed by: GENERAL ACUTE CARE HOSPITAL

## 2024-10-16 PROCEDURE — 97110 THERAPEUTIC EXERCISES: CPT | Mod: GP | Performed by: GENERAL ACUTE CARE HOSPITAL

## 2024-10-16 NOTE — LETTER
October 16, 2024    Neel Alcazar PT  [address] [facility], [address] [address]    Patient: Ron Clarke   YOB: 1980   Date of Visit: 10/16/2024       Dear Arjun Murphy MD  [address] [facility], [address] [place] [address],  [place] [address]    The attached plan of care is being sent to you because your patient’s medical reimbursement requires that you certify the plan of care. Your signature is required to allow uninterrupted insurance coverage.      You may indicate your approval by signing below and faxing this form back to us at Dept Fax: [phone].    Please call Dept: [phone] with any questions or concerns.    Thank you for this referral,        Neel Alcazar PT  [place] 1995 [address] [address]    Payer: Payor: CARY / Plan: HIGHMARK / Product Type: *No Product type* /                                                                         Date:     Dear Neel Alcazar PT,     Re: Mr. Ron Clarke, MRN:[id number]    I certify that I have reviewed the attached plan of care and it is medically necessary for Mr. Ron Clarke (1980) who is under my care.          ______________________________________                    _________________  Provider name and credentials                                           Date and time                                                                                           Plan of Care 10/16/24   Effective from: 10/16/2024  Effective to: 1/14/2025    Plan ID: [id number]            Participants as of Finalize on 10/16/2024    Name Type Comments Contact Info    Arjun Murphy MD Referring Provider  [phone]    Neel Alcazar PT Physical Therapist  [phone]       Last Plan Note     Author: Neel Alcazar PT Status: Incomplete Last edited: 10/16/2024  8:30 AM       Patient Name: Ron Clarke  MRN: [id number]  Today's Date:  10/16/2024     Current Problem:  1. Chronic right shoulder pain        2. Superior glenoid labrum lesion of right shoulder, subsequent encounter  Referral to Physical Therapy      3. Decreased range of motion of right shoulder        4. Weakness of right shoulder            Visit 1/20  DOS: 10/4/24  Primary Complaint/ Functional Limitation: sling, unable to use right UE ( right UE dominant)  Prior level of function: Independent   Date of onset: 3/23  Cause: gym, bench press felt twinge and continued to workout, throwing   Pain Location: right shoulder   Current Pain: 0/10  Worst Pain: 5/10  Description of pain: ache   Aggravating Activities: use   Relieving Activities: ice pain meds (Ibuprofen)  Work Requirements/ Hobbies:    Prior Treatment and results of Prior treatment: injection   Constitutional Symptoms: Patient Denies   Fever Chills Nausea Vomiting Loss of appetite Abdominal pain Change in bowel function Weight loss or gain Headache Unexplained fatigue Malaise Lethargy Weakness Arthralgia  Myalgia  Difficulty in sleeping Breathing trouble  Barriers to treatment/ learning: none     Posture:   Head: forward    Shoulders: rounded    Scapula Rest: protracted and rotated    Scapula Elevation: NT     Palpation: tenderness right post ant shoulder     QuickDash= 64  Shoulder ROM (degrees)   Shoulder Flexion R/L: 75/180   Shoulder Extension R/L: NT/40   Shoulder Internal Rotation R/L: 20/70   Shoulder External Rotation R/L: 15/90      Strength:   Shoulder Flexion R/L:    nt#,  60 #   Shoulder Extension R/L:  nt #,   #nt   Shoulder Internal Rotation R/L:   nt#,   45#       (45deg abd)   Shoulder External Rotation R/L:   nt #,   40#      (45deg abd)    Evaluation, Tests and measures, Education including HEP instruction and feedback. Patient appears to be compliant and motivated to perform HEP as instructed and participate in active physical therapy as indicated. The patient was educated on: the  importance of positioning, proper posture, and body mechanics, joint mechanics and pathology, general tissue healing time, the appropriate use of heat and cold to control pain and inflammation, the importance of general therapeutic exercise, especially to stay within pain-free ROM, specific anatomy, function, & regional interdependence of involved areas, & likely cause of impairments & POC.  Patient's questions were answered to their satisfaction, & patient verbalized understanding & agreement with POC.  The patient presents to Physical Therapy with signs and symptoms consistent with the medical diagnosis of SLAP tear/repair 10/4/24. Key impairments include:  pain decreased ROM strength and difficulty with functional activities such as use. Skilled PT is required to address these key impairments and to provide and progress with an appropriate home exercise program. This evaluation is of  moderate complexity due to the stable/changing/unstable nature of the patient's presentation as well as the comorbidities and medical factors included in this evaluation.  Treatment may include: Therapeutic Exercise (PROM, AA/AROM, Flexibility, Strengthening, Stabilization, HEP instruction). Therapeutic Activities (Transfers, Body Mechanics, Work Related Activities, Closed Chain, Agility and Power).   Manual Techniques (Soft tissue Mobilization, Joint mobilization/Distraction, Muscle Energy Techniques, Lymphatic Drainage, Dry Needling).  Neuromuscular Reeducation (Postural Training, Balance/Proprioception, Relaxation Techniques). Biofeedback. Aquatic Exercise. Modalities: Ultrasound, Moist Heat, Cryotherapy, Vasopneumatic with or without Cryotherapy. Electrical Stimulation (TENS/IFC/ Pre modulated for pain relief, NMES for Muscle reeducation). Gait Training. Orthotic Fit and Training. Strapping, Kinesio taping.   Patient will report resting pain on Visual Analog Scale < or = 0 .   Pain at worst will be < or = 0 .   Pain with  (patients primary complaint) will be < or = 0 .    QuickDash score will improve >/= 16 points to demonstrate improved upper extremity functional abilities.    Shoulder AROM will be >/= 170  deg Flexion,  70 deg Internal Rotation,   80 deg External Rotation to demonstrates improved joint mechanics during functional activities.     Patient will Cross Body Reach to or beyond contralateral AC joint to demonstrate improved shoulder mechanics.    Patient will raise affected arm overhead with improved scapular humeral rhythm to prevent shoulder pathomechanics.    Strength of shoulder flexion will be >/=    5/5 , shoulder abduction>/= 5/5   , Shoulder External Rotation >/= 5/5   , Shoulder Internal Rotation >/= 5/5    to improve joint stability with functional use of the upper extremities.    Rhomboid, Middle Trapezius, and Lower Trapezius muscle test will be >/= 4/5 to demonstrate improved scapular stabilization to improve functional use of upper extremities.    Scapular rotation with extremity elevation overhead will be 50-60 degrees without deviation or eccentric winging to demonstrate improved stabilization and reduction of impingement position.    Patient will demonstrate improved posture without cueing through therapy session indicated by neutral cervical tilt, neutral shoulder position, and decreased cervical lordosis      Patient will correctly perform home exercise program independently, demonstrated through patient teach back upon discharge.          Current Participants as of 10/16/2024    Name Type Comments Contact Info    Arjun Murphy MD Referring Provider  [phone]    Signature pending    Neel Alcazar PT Physical Therapist  [phone]    Signature pending

## 2024-10-16 NOTE — PROGRESS NOTES
Patient Name: Ron Clarke  MRN: [id number]  Today's Date: 10/16/2024     Current Problem:  1. Chronic right shoulder pain        2. Superior glenoid labrum lesion of right shoulder, subsequent encounter  Referral to Physical Therapy      3. Decreased range of motion of right shoulder        4. Weakness of right shoulder            Visit 1/20  DOS: 10/4/24  Primary Complaint/ Functional Limitation: sling, unable to use right UE ( right UE dominant)  Prior level of function: Independent   Date of onset: 3/23  Cause: gym, bench press felt twinge and continued to workout, throwing   Pain Location: right shoulder   Current Pain: 0/10  Worst Pain: 5/10  Description of pain: ache   Aggravating Activities: use   Relieving Activities: ice pain meds (Ibuprofen)  Work Requirements/ Hobbies:    Prior Treatment and results of Prior treatment: injection   Constitutional Symptoms: Patient Denies   Fever Chills Nausea Vomiting Loss of appetite Abdominal pain Change in bowel function Weight loss or gain Headache Unexplained fatigue Malaise Lethargy Weakness Arthralgia  Myalgia  Difficulty in sleeping Breathing trouble  Barriers to treatment/ learning: none     Posture:   Head: forward    Shoulders: rounded    Scapula Rest: protracted and rotated    Scapula Elevation: NT     Palpation: tenderness right post ant shoulder     QuickDash= 64  Shoulder ROM (degrees)   Shoulder Flexion R/L: 75/180   Shoulder Extension R/L: NT/40   Shoulder Internal Rotation R/L: 20/70   Shoulder External Rotation R/L: 15/90      Strength:   Shoulder Flexion R/L:    nt#,  60 #   Shoulder Extension R/L:  nt #,   #nt   Shoulder Internal Rotation R/L:   nt#,   45#       (45deg abd)   Shoulder External Rotation R/L:   nt #,   40#      (45deg abd)    Evaluation, Tests and measures, Education including HEP instruction and feedback. Patient appears to be compliant and motivated to perform HEP as instructed and participate in active physical therapy  as indicated. The patient was educated on: the importance of positioning, proper posture, and body mechanics, joint mechanics and pathology, general tissue healing time, the appropriate use of heat and cold to control pain and inflammation, the importance of general therapeutic exercise, especially to stay within pain-free ROM, specific anatomy, function, & regional interdependence of involved areas, & likely cause of impairments & POC.  Patient's questions were answered to their satisfaction, & patient verbalized understanding & agreement with POC.  The patient presents to Physical Therapy with signs and symptoms consistent with the medical diagnosis of SLAP tear/repair 10/4/24. Key impairments include:  pain decreased ROM strength and difficulty with functional activities such as use. Skilled PT is required to address these key impairments and to provide and progress with an appropriate home exercise program. This evaluation is of  moderate complexity due to the stable/changing/unstable nature of the patient's presentation as well as the comorbidities and medical factors included in this evaluation.  Treatment may include: Therapeutic Exercise (PROM, AA/AROM, Flexibility, Strengthening, Stabilization, HEP instruction). Therapeutic Activities (Transfers, Body Mechanics, Work Related Activities, Closed Chain, Agility and Power).   Manual Techniques (Soft tissue Mobilization, Joint mobilization/Distraction, Muscle Energy Techniques, Lymphatic Drainage, Dry Needling).  Neuromuscular Reeducation (Postural Training, Balance/Proprioception, Relaxation Techniques). Biofeedback. Aquatic Exercise. Modalities: Ultrasound, Moist Heat, Cryotherapy, Vasopneumatic with or without Cryotherapy. Electrical Stimulation (TENS/IFC/ Pre modulated for pain relief, NMES for Muscle reeducation). Gait Training. Orthotic Fit and Training. Strapping, Kinesio taping.   Patient will report resting pain on Visual Analog Scale < or = 0 .   Pain  at worst will be < or = 0 .   Pain with (patients primary complaint) will be < or = 0 .    QuickDash score will improve >/= 16 points to demonstrate improved upper extremity functional abilities.    Shoulder AROM will be >/= 170  deg Flexion,  70 deg Internal Rotation,   80 deg External Rotation to demonstrates improved joint mechanics during functional activities.     Patient will Cross Body Reach to or beyond contralateral AC joint to demonstrate improved shoulder mechanics.    Patient will raise affected arm overhead with improved scapular humeral rhythm to prevent shoulder pathomechanics.    Strength of shoulder flexion will be >/=    5/5 , shoulder abduction>/= 5/5   , Shoulder External Rotation >/= 5/5   , Shoulder Internal Rotation >/= 5/5    to improve joint stability with functional use of the upper extremities.    Rhomboid, Middle Trapezius, and Lower Trapezius muscle test will be >/= 4/5 to demonstrate improved scapular stabilization to improve functional use of upper extremities.    Scapular rotation with extremity elevation overhead will be 50-60 degrees without deviation or eccentric winging to demonstrate improved stabilization and reduction of impingement position.    Patient will demonstrate improved posture without cueing through therapy session indicated by neutral cervical tilt, neutral shoulder position, and decreased cervical lordosis      Patient will correctly perform home exercise program independently, demonstrated through patient teach back upon discharge.     REHABILITATION PROTOCOL  PHASE ONE (Weeks 1-3)  In phase one, the general goals are to protect the surgical repair, initiate ROM to prevent  adhesions and increase circulation, decrease pain and inflammation, and stress emphasis of  HEP.  A sling will be worn for 3 weeks unless instructed otherwise by Dr. Murphy. The sling is to be  removed 2-3X/day to perform HEP.  ROM limitations:  ? Passive and active-assisted ROM ONLY:  ?  Flexion:   0-60° by week 1   0-75° by week 2   0-90° by week 3  ? ER in scapular plane:   0-15° by week 1   0-30° by week 2-3  ? IR in scapular plane: as tolerated  ? Active wrist and elbow full ROM  EXERCISES:  ROM:  ? Pendulums  ? Rope and pulley - flexion, scaption  ? AAROM with cane/wand into flexion, ER at 0° and 45°  ? Seated or supine posterior cuff stretch into horizontal adduction  ? Grade I-II g-h and scapular joint mobilizations and manual stretching  Strength:  ? Hand gripping exercises - putty  ? Sub-maximal pain-free isometrics at 0° abduction (IR, ER, Ext, Abd) - NO  ELBOW or SHOULDER FLEXION  ? UBE with no resistance  ? Scapular stabilizer strengthening - rows, shrugs, punches  Modalities:  ? Heat prior to treatment  4  ? Ice following treatment and when needed  PHASE TWO (Week 3-6)  General goals in Phase Two are to gradually restore ROM, initiate active muscle contractions  with a focus on regaining proper scapulo-humeral rhythm, begin to train joint proprioception,  and continue with HEP.  ROM Limitations/goal:  ? Flexion/elevation 145° by week 6  ? ER in scapular plane 50° by week 6  ? IR in scapular plane Full ROM by week 6  EXERCISES:  ROM:  ? Continue with AAROM exercises from Phase One - pulley, cane/wand  ? Initiate towel IR stretch if needed  ? Posterior capsule stretch  ? G-H joint mobilization emphasizing posterior and inferior glides. Should be  pain-free and in loose/open packed position.  ? Passive stretching should be performed following mobilizations.  Strength:  ? IR/ER with theraband using towel roll between upper arm and thorax  ? Side-step holding t-band at neutral IR/ER for isometric resistance  ? DB therex - flexion, scaption, empty can, deceleration  ? Triceps with theraband  ? Rhythmic stab progressing from supine to sidelying to partial sitting to standing  as tolerated  ? Scapular strengthening therex including seated rows, shrugs, punches  ? PNF patterns with manual  resistance  ? Light bicep curls can be initiated at 3 weeks  Modalities:  ? Heat prior to treatment  ? Ice following treatment and when needed  PHASE THREE (Week 6-12)  5  The goals in this phase are to restore full active ROM, progress strengthening and scapular  stabilization exercises, and initiate more functional drills into rehab program.  ROM goals:  ? Full ROM all planes by 10-12 weeks  EXERCISES:  ROM:  ? Continue with previous exercises to gain full ROM  ? May need to add chicken wing stretch for ER  ? Mobilizations may be more aggressive if needed  Strength:  ? Continue with previous T-band and C. column exercises, increasing intensity,  sets, reps as able  ? Continue with db therex increasing sets, reps, and intensity (up to 7 lbs)  ? Initiate T-band ER at 90/90 position - slow and fast reps  ? Initiate prone db therex including scaption at 130° with thumb up, horizontal  abduction with thumb up, extension with palm down, ER   Week 8:  ? Initiate two-handed plyometrics including ball toss -chest pass, overhead pass,  diagonals   Week 10:  ? Biodex - isokinetics for IR/ER beginning in modified neutral position, progress  to 90/90 position in scapular plane  Modalities:  ? Ice as needed  PHASE FOUR (Week 12-24)  Goals include regaining full functional strength, implementing functional or sports specific  training, and establishing a progressive gym program for continued strengthening and  endurance training.  6  ? Continue as needed with ROM exercises  ? UBE high resistance, for endurance  ? Progress to one-handed plyos including ball toss, ball on wall  ? Eccentric RC strengthening using plyoball, deceleration tosses, T-band  ? Large muscle exercises including shoulder press, lat pull-downs, bench press -  do not allow elbow to extend past plane of thorax  ? For high school athletes: at 12 weeks, passive ER should be 100-105°; allow the  athlete to gain the rest on his own.  ? For college/pro athletes: at  12 weeks, active ER should be 100-105° and passive  ER should be 110-115° - allow the rest to come on its own  ? Initiate interval throwing program at wk 16 -

## 2024-10-17 ENCOUNTER — APPOINTMENT (OUTPATIENT)
Dept: ORTHOPEDIC SURGERY | Facility: CLINIC | Age: 44
End: 2024-10-17
Payer: COMMERCIAL

## 2024-10-17 DIAGNOSIS — M25.511 RIGHT SHOULDER PAIN, UNSPECIFIED CHRONICITY: Primary | ICD-10-CM

## 2024-10-17 DIAGNOSIS — S43.431A LABRAL TEAR OF SHOULDER, RIGHT, INITIAL ENCOUNTER: ICD-10-CM

## 2024-10-17 PROCEDURE — 1036F TOBACCO NON-USER: CPT | Performed by: ORTHOPAEDIC SURGERY

## 2024-10-17 PROCEDURE — 99024 POSTOP FOLLOW-UP VISIT: CPT | Performed by: ORTHOPAEDIC SURGERY

## 2024-10-17 RX ORDER — CYCLOBENZAPRINE HCL 10 MG
10 TABLET ORAL NIGHTLY PRN
Qty: 30 TABLET | Refills: 0 | Status: SHIPPED | OUTPATIENT
Start: 2024-10-17

## 2024-10-17 ASSESSMENT — PAIN - FUNCTIONAL ASSESSMENT: PAIN_FUNCTIONAL_ASSESSMENT: NO/DENIES PAIN

## 2024-10-17 NOTE — PROGRESS NOTES
History of Present Illness:  Patient returns today after shoulder arthroscopy.  Denies any numbness tingling or shortness of breath.  Pain is appropriate for post-op course.    Physical Examination:  Mild swelling  Incisions healthy, no drainage or erythema  Tolerates gentle passive forward flexion  Neurovascular exam normal distally    Assessment:  Patient status post right shoulder arthroscopy anterior labral repair, SAD, DCE    Plan:  Surgical details discussed.  Encouraged non-opioid pain medications.  Discussed sling wear and activity restrictions.  Discussed physical therapy protocol.  Follow-up ~ 1 month.            Arthroscopic Anterior Shoulder Stabilization/Bankart  Post-op Rehab    GENERAL REHABILITATION GUIDELINES AND PRINCIPLES:   Rehabilitation exercises are essential for full recovery from your shoulder injury and subsequent surgical procedure.  Following the guidelines and principles described below will minimize your recovery time and maximize return to full activity.    IMMEDIATE POST-OPERATIVE GOALS (within 2 weeks)  Understand use of ice and/or pendulums to control post-operative pain  Active assisted shoulder forward flexion (elevation) of a least 0 - 90° while lying on back (supine)  Active assisted external rotation of at least 0°.    EXPECTED RECOVERY:  Use Sling for 4 weeks  Return to school, desk-type work within 4 - 5 days following surgery  Return to use of shoulder for typical household chores by 4 - 6 weeks after surgery  Return to heavy occupational activities or sports by 6 - 9 months    WOUND CARE  Remove bandages two days after surgery  May shower two days after surgery keeping wound out of direct path of water.  Thoroughly pat dry after showering.  No soaking of wound (i.e. bath) for 10 - 14 days.  Cover with fresh band-aids (if needed)  Follow up with physician as scheduled at approximately 7 - 10 days after surgery  Signs of infection include: excessive or persistent pain,  redness around the wound sites, drainage from the wound, or a fever.  If any of these signs are present, contact your physician immediately    PAIN/SWELLING CONTROL:  Rest - Limit the use of your shoulder as much as possible for first few days after surgery.  Ice - 3 times a day for 20 minutes each, following rehab/activity/work or as needed to control pain.  Medication - Pain medication will be prescribed for a period of seven days after surgery.  Refills of this medication are not typically given.  Over the counter medications such as Acetaminophen (Tylenol) or Ibuprofen (Advil) are very effective for pain control after the initial post-operative period.    PROGRESSION CRITERIA:   General Principles:  Outside of rehabilitation sessions, try to limit activity as much as possible for first 2 -3 days.  Progress slowly initially using pain and swelling as a guide (indication) to progress in rehabilitation sessions.  Add only 1 - 2 new exercises/activities to your routine each day.  Ice the involved shoulder for 20 minutes following each rehabilitation session and after other strenuous activities.  If activities or exercises added to your routine cause pain, swelling, or recurrence of other symptoms, discontinue exercise and consult the physician and/or physical therapist immediately.    RETURN TO SPORT PROGRESSION:  Progress from Range of Motion (ROM) exercises to supervised strengthening exercises.  Progress to neuromuscular control exercises for the shoulder.  Progress to upper body weight lifting.  Progress to short toss program.  Progress to throwing with team mates.  Progress to throwing from the mound.    RETURN TO SPORT CRITERIA:  Your physician or physical therapist will “clear” you to return to sport when you have accomplished the following goals.  No complaints of pain  Full ROM  Appropriate shoulder stability upon clinical examination  Adequate strength and performance with functional performance  tests  Throwing without pain and with appropriate from (if required by your sport)    In addition to accomplishing the above goals, you should not return to sport until you feel that you are “ready” to safely return at or near your pre-injury level.       ROM THERAPEUTIC EXERCISES   Initial post-op (1-14 days) Forward Flexion:  0 - 90°  External Rotation (arm at side):  0° Self stretching:  Pendulums, Active-assisted shoulder forward flexion, extension, external rotation  Strengthening:  Forearm      Progressive Rehabilitation  (2 - 4 weeks) Forward Flexion: 0 - 120°  External Rotation (arm at side):  0 - 15° Self stretching:  As above.  Add cross arm body adduction and functional IR.  Physical Therapy stretching:  Add passive ROM with a physical therapist for forward flexion, ER, and IR.  Strengthening:  As above.  Biceps and Triceps strengthening.  IR and ER isometrics.     Progressive Rehabilitation (4 - 6 weeks) Forward Flexion:  0 - 140°  External Rotation (arm at side):  0 - 35° Self stretching:  As above.  Physical therapy stretching:  As above.  Add 90° abduction and ER as needed.  Strengthening:  Add rotator cuff strengthening (IR, ER, extension) with TB or light weights.  Proprioceptive Ex:  Rhythmic stabilization with physical therapist.  Body blade     Advanced Strengthening (6 - 12 weeks) Forward Flexon:  Full  External Rotation  (arm at side):  Full  (abducted):  0 - 45°   Self stretching:  As needed.  Strengthening:  As above.  Progress to weight machines.  Proprioceptive Ex:  Progress as tolerated   Return to Sport   (12 weeks -   6 months) Full Self stretching:  Before weight lifting/sport as needed  Strengthening:  Progress as tolerated.  No restrictions to rotator cuff strengthening program.  Proprioceptive Ex:  Progress as tolerated.  Light plyometrics (plyoback, physical therapist toss).     Return to pitching   (4 - 6 months) Full Strengthening:  Progress as tolerated.    Throwing Program:   Gradually work towards throwing from the mound.  Cleared for full participation when can throw from mound with good form, ball control, and endurance.     **-Minimum expected range of motion

## 2024-10-25 ENCOUNTER — TREATMENT (OUTPATIENT)
Dept: PHYSICAL THERAPY | Facility: CLINIC | Age: 44
End: 2024-10-25
Payer: COMMERCIAL

## 2024-10-25 DIAGNOSIS — S43.431D SUPERIOR GLENOID LABRUM LESION OF RIGHT SHOULDER, SUBSEQUENT ENCOUNTER: ICD-10-CM

## 2024-10-25 DIAGNOSIS — G89.29 CHRONIC RIGHT SHOULDER PAIN: ICD-10-CM

## 2024-10-25 DIAGNOSIS — M25.511 CHRONIC RIGHT SHOULDER PAIN: ICD-10-CM

## 2024-10-25 DIAGNOSIS — R29.898 WEAKNESS OF RIGHT SHOULDER: ICD-10-CM

## 2024-10-25 DIAGNOSIS — M25.611 DECREASED RANGE OF MOTION OF RIGHT SHOULDER: ICD-10-CM

## 2024-10-25 PROCEDURE — 97110 THERAPEUTIC EXERCISES: CPT | Mod: GP | Performed by: GENERAL ACUTE CARE HOSPITAL

## 2024-10-25 NOTE — PROGRESS NOTES
Patient Name: Ron Clarke  MRN: [id number]  Today's Date: 10/25/2024  Time Calculation  Start Time: 1615  Stop Time: 1655  Time Calculation (min): 40 min  Current Problem:  1. Superior glenoid labrum lesion of right shoulder, subsequent encounter  Follow Up In Physical Therapy      2. Chronic right shoulder pain  Follow Up In Physical Therapy      3. Decreased range of motion of right shoulder  Follow Up In Physical Therapy      4. Weakness of right shoulder  Follow Up In Physical Therapy          Visit 2/20    Subjective:  Change in pain/ pain level: 4/10    Treatment:    Therapeutic exercise (91602):  Self stretching: Pendulums, Active-assisted shoulder forward flexion, extension, external rotation cross arm body adduction and functional IR.  Physical Therapy stretching:  Add passive ROM with a physical therapist for forward flexion, ER, and IR.  Strengthening:  As above.  Biceps and Triceps strengthening.  IR and ER isometrics.    Access Code: [email or web address]  URL: [email or web address]  Date: 10/25/2024  Prepared by: Neel Alcazar    Exercises  - Circular Shoulder Pendulum with Table Support  - 1-2 x daily - 7 x weekly - 20 reps  - Standing Shoulder Extension with Dowel  - 1-2 x daily - 7 x weekly - 20 reps  - Standing Shoulder External Rotation AAROM with Dowel (Mirrored)  - 1-2 x daily - 7 x weekly - 20 reps  - Shoulder Flexion Overhead with Dowel (Mirrored)  - 1-2 x daily - 7 x weekly - 20 reps  - Seated Shoulder Flexion AAROM with Pulley Behind  - 1-2 x daily - 7 x weekly - 20 reps  - Standing Shoulder Internal Rotation AAROM with Pulley  - 1-2 x daily - 7 x weekly - 20 reps  - Standing Shoulder Posterior Capsule Stretch  - 1-2 x daily - 7 x weekly - 20 reps  - Standing Isometric Shoulder Internal Rotation at Doorway  - 1-2 x daily - 7 x weekly - 10 reps - 5 hold  - Standing Isometric Shoulder External Rotation with Doorway  - 1-2 x daily - 7 x weekly - 10 reps - 5  hold  Assessment:  Patient continues to require active therapy to progress functional capabilities with decreasing pain levels and improving mechanics with functional activities including progression of Home exercise program. Tolerance to today's treatment was good.   Patient appears motivated and compliant this date, demonstrating a working understanding of principals instructed and home program.    Plan:  Progress with functional strength as tolerated with respect to tissue healing. Manual therapy PRN to improve/maintain ROM, prevent adhesion, reduce pain.

## 2024-10-28 ENCOUNTER — TREATMENT (OUTPATIENT)
Dept: PHYSICAL THERAPY | Facility: CLINIC | Age: 44
End: 2024-10-28
Payer: COMMERCIAL

## 2024-10-28 DIAGNOSIS — S43.431D SUPERIOR GLENOID LABRUM LESION OF RIGHT SHOULDER, SUBSEQUENT ENCOUNTER: ICD-10-CM

## 2024-10-28 DIAGNOSIS — R29.898 WEAKNESS OF RIGHT SHOULDER: ICD-10-CM

## 2024-10-28 DIAGNOSIS — M25.611 DECREASED RANGE OF MOTION OF RIGHT SHOULDER: ICD-10-CM

## 2024-10-28 DIAGNOSIS — M25.511 CHRONIC RIGHT SHOULDER PAIN: ICD-10-CM

## 2024-10-28 DIAGNOSIS — G89.29 CHRONIC RIGHT SHOULDER PAIN: ICD-10-CM

## 2024-10-28 PROCEDURE — 97140 MANUAL THERAPY 1/> REGIONS: CPT | Mod: GP | Performed by: GENERAL ACUTE CARE HOSPITAL

## 2024-10-28 PROCEDURE — 97110 THERAPEUTIC EXERCISES: CPT | Mod: GP | Performed by: GENERAL ACUTE CARE HOSPITAL

## 2024-10-31 ENCOUNTER — TREATMENT (OUTPATIENT)
Dept: PHYSICAL THERAPY | Facility: CLINIC | Age: 44
End: 2024-10-31
Payer: COMMERCIAL

## 2024-10-31 DIAGNOSIS — M25.611 DECREASED RANGE OF MOTION OF RIGHT SHOULDER: ICD-10-CM

## 2024-10-31 DIAGNOSIS — M25.511 CHRONIC RIGHT SHOULDER PAIN: ICD-10-CM

## 2024-10-31 DIAGNOSIS — R29.898 WEAKNESS OF RIGHT SHOULDER: ICD-10-CM

## 2024-10-31 DIAGNOSIS — G89.29 CHRONIC RIGHT SHOULDER PAIN: ICD-10-CM

## 2024-10-31 DIAGNOSIS — S43.431D SUPERIOR GLENOID LABRUM LESION OF RIGHT SHOULDER, SUBSEQUENT ENCOUNTER: ICD-10-CM

## 2024-10-31 PROCEDURE — 97140 MANUAL THERAPY 1/> REGIONS: CPT | Mod: GP | Performed by: GENERAL ACUTE CARE HOSPITAL

## 2024-10-31 PROCEDURE — 97110 THERAPEUTIC EXERCISES: CPT | Mod: GP | Performed by: GENERAL ACUTE CARE HOSPITAL

## 2024-11-04 ENCOUNTER — TREATMENT (OUTPATIENT)
Dept: PHYSICAL THERAPY | Facility: CLINIC | Age: 44
End: 2024-11-04
Payer: COMMERCIAL

## 2024-11-04 DIAGNOSIS — M25.511 CHRONIC RIGHT SHOULDER PAIN: ICD-10-CM

## 2024-11-04 DIAGNOSIS — R29.898 WEAKNESS OF RIGHT SHOULDER: ICD-10-CM

## 2024-11-04 DIAGNOSIS — G89.29 CHRONIC RIGHT SHOULDER PAIN: ICD-10-CM

## 2024-11-04 DIAGNOSIS — S43.431D SUPERIOR GLENOID LABRUM LESION OF RIGHT SHOULDER, SUBSEQUENT ENCOUNTER: ICD-10-CM

## 2024-11-04 DIAGNOSIS — M25.611 DECREASED RANGE OF MOTION OF RIGHT SHOULDER: ICD-10-CM

## 2024-11-04 PROCEDURE — 97140 MANUAL THERAPY 1/> REGIONS: CPT | Mod: GP | Performed by: GENERAL ACUTE CARE HOSPITAL

## 2024-11-04 PROCEDURE — 97110 THERAPEUTIC EXERCISES: CPT | Mod: GP | Performed by: GENERAL ACUTE CARE HOSPITAL

## 2024-11-04 NOTE — PROGRESS NOTES
Patient Name: Ron Clarke  MRN: [id number]  Today's Date: 11/4/2024  Time Calculation  Start Time: 1700  Stop Time: 1740  Time Calculation (min): 40 min  Current Problem:  1. Superior glenoid labrum lesion of right shoulder, subsequent encounter  Follow Up In Physical Therapy      2. Chronic right shoulder pain  Follow Up In Physical Therapy      3. Decreased range of motion of right shoulder  Follow Up In Physical Therapy      4. Weakness of right shoulder  Follow Up In Physical Therapy          Visit 5/20  DOS: 10/4/24 (4 weeks 3 days post op)  SLAP   Subjective:  Change in pain/ pain level: 2/10  Change in function: nothing new to report with a change of function  Patient comments: having some deltoid pain     Treatment:    Therapeutic exercise (72057):    UBE  Bodyblade   Sling ER, Abd, Habd  Supine punches /alpha  Access Code: [email or web address]  URL: [email or web address]  Date: 11/04/2024  Prepared by: Neel Alcazar    Exercises  - Circular Shoulder Pendulum with Table Support  - 1-2 x daily - 7 x weekly - 20 reps  - Standing Shoulder Extension with Dowel  - 1-2 x daily - 7 x weekly - 20 reps  - Standing Shoulder External Rotation AAROM with Dowel (Mirrored)  - 1-2 x daily - 7 x weekly - 20 reps  - Shoulder Flexion Overhead with Dowel (Mirrored)  - 1-2 x daily - 7 x weekly - 20 reps  - Seated Shoulder Flexion AAROM with Pulley Behind  - 1-2 x daily - 7 x weekly - 20 reps  - Standing Shoulder Internal Rotation AAROM with Pulley  - 1-2 x daily - 7 x weekly - 20 reps  - Standing Shoulder Posterior Capsule Stretch  - 1-2 x daily - 7 x weekly - 20 reps  - Standing Isometric Shoulder Internal Rotation at Doorway  - 1-2 x daily - 7 x weekly - 10 reps - 5 hold  - Standing Isometric Shoulder External Rotation with Doorway  - 1-2 x daily - 7 x weekly - 10 reps - 5 hold  - Standing Shoulder Row with Anchored Resistance  - 1-2 x daily - 7 x weekly - 20 reps  - Shoulder External Rotation with Anchored  Resistance  - 1-2 x daily - 7 x weekly - 20 reps  - Shoulder extension with resistance - Neutral  - 1-2 x daily - 7 x weekly - 20 reps  - Shoulder Internal Rotation with Resistance  - 1-2 x daily - 7 x weekly - 20 reps  Manual Therapy (21441):  Glenohumeral mobilization A-P Inferior Lateral grade 3/4 Multiple planes  Stretch elevation, IR, ER, cross body  Scapular mobilization all planes/ manual scapular stabilization  TPR subscapularis, Upper Trapezius, Pectoralis major, Pectoralis minor, latissimus dorsi    Assessment:  Patient notes improved pain levels resultant from activities in therapy session. Improved tissue quality noted as well as body mechanics demonstrated after cueing and corrections. Patient continues to require active therapy to progress functional capabilities with decreasing pain levels and improving mechanics with functional activities including progression of Home exercise program. Tolerance to today's treatment was good. Muscle fatigue noted.  Patient appears motivated and compliant this date, demonstrating a working understanding of principals instructed and home program.    Plan:  Progress with functional strength as tolerated with respect to tissue healing. Manual therapy PRN to improve/maintain ROM, prevent adhesion, reduce pain.

## 2024-11-06 ENCOUNTER — TREATMENT (OUTPATIENT)
Dept: PHYSICAL THERAPY | Facility: CLINIC | Age: 44
End: 2024-11-06
Payer: COMMERCIAL

## 2024-11-06 DIAGNOSIS — M25.511 CHRONIC RIGHT SHOULDER PAIN: ICD-10-CM

## 2024-11-06 DIAGNOSIS — G89.29 CHRONIC RIGHT SHOULDER PAIN: ICD-10-CM

## 2024-11-06 DIAGNOSIS — S43.431D SUPERIOR GLENOID LABRUM LESION OF RIGHT SHOULDER, SUBSEQUENT ENCOUNTER: ICD-10-CM

## 2024-11-06 DIAGNOSIS — M25.611 DECREASED RANGE OF MOTION OF RIGHT SHOULDER: ICD-10-CM

## 2024-11-06 DIAGNOSIS — R29.898 WEAKNESS OF RIGHT SHOULDER: ICD-10-CM

## 2024-11-06 PROCEDURE — 97140 MANUAL THERAPY 1/> REGIONS: CPT | Mod: GP | Performed by: GENERAL ACUTE CARE HOSPITAL

## 2024-11-06 PROCEDURE — 97110 THERAPEUTIC EXERCISES: CPT | Mod: GP | Performed by: GENERAL ACUTE CARE HOSPITAL

## 2024-11-06 NOTE — PROGRESS NOTES
Patient Name: Ron Clarke  MRN: [id number]  Today's Date: 11/6/2024  Time Calculation  Start Time: 0745  Stop Time: 0825  Time Calculation (min): 40 min  Current Problem:  1. Superior glenoid labrum lesion of right shoulder, subsequent encounter  Follow Up In Physical Therapy      2. Chronic right shoulder pain  Follow Up In Physical Therapy      3. Decreased range of motion of right shoulder  Follow Up In Physical Therapy      4. Weakness of right shoulder  Follow Up In Physical Therapy          Visit 6/20  DOS: 10/4/24 (4 weeks 5 days post op)  SLAP   Subjective:  Change in pain/ pain level: 2/10  Change in function: did some yard work yesterday   Patient comments: sore after last session      Objective: elevation 120 AAROM    Treatment:    Therapeutic exercise (07165):  UBE  Bodyblade   Sling ER, Abd, Habd  Supine punches /alpha  Access Code: [email or web address]  URL: [email or web address]  Date: 11/06/2024  Prepared by: Neel Alcazar    Exercises  - Circular Shoulder Pendulum with Table Support  - 1-2 x daily - 7 x weekly - 20 reps  - Standing Shoulder Extension with Dowel  - 1-2 x daily - 7 x weekly - 20 reps  - Standing Shoulder External Rotation AAROM with Dowel (Mirrored)  - 1-2 x daily - 7 x weekly - 20 reps  - Shoulder Flexion Overhead with Dowel (Mirrored)  - 1-2 x daily - 7 x weekly - 20 reps  - Seated Shoulder Flexion AAROM with Pulley Behind  - 1-2 x daily - 7 x weekly - 20 reps  - Standing Shoulder Internal Rotation AAROM with Pulley  - 1-2 x daily - 7 x weekly - 20 reps  - Standing Shoulder Posterior Capsule Stretch  - 1-2 x daily - 7 x weekly - 20 reps  - Standing Shoulder Row with Anchored Resistance  - 1-2 x daily - 7 x weekly - 20 reps  - Shoulder External Rotation with Anchored Resistance  - 1-2 x daily - 7 x weekly - 20 reps  - Shoulder extension with resistance - Neutral  - 1-2 x daily - 7 x weekly - 20 reps  - Shoulder Internal Rotation with Resistance  - 1-2 x daily - 7 x  weekly - 20 reps  - Cervical Retraction with Overpressure  - 8 x daily - 7 x weekly - 10 reps  - Seated Cervical Retraction and Extension  - 8 x daily - 7 x weekly - 10 reps  Manual Therapy (58600):  Glenohumeral mobilization A-P Inferior Lateral grade 3/4 Multiple planes  Stretch elevation, IR, ER, cross body  Scapular mobilization all planes/ manual scapular stabilization  TPR subscapularis, Upper Trapezius, Pectoralis major, Pectoralis minor, latissimus dorsi    Assessment:   Patient notes improved pain levels resultant from activities in therapy session. Improved tissue quality noted as well as body mechanics demonstrated after cueing and corrections. Patient continues to require active therapy to progress functional capabilities with decreasing pain levels and improving mechanics with functional activities including progression of Home exercise program. Tolerance to today's treatment was good. Muscle fatigue noted.  Patient appears motivated and compliant this date, demonstrating a working understanding of principals instructed and home program.    Plan:  Progress with functional strength as tolerated with respect to tissue healing. Manual therapy PRN to improve/maintain ROM, prevent adhesion, reduce pain.    ROM THERAPEUTIC EXERCISES   Initial post-op (1-14 days) Forward Flexion:  0 - 90°  External Rotation (arm at side):  0° Self stretching:  Pendulums, Active-assisted shoulder forward flexion, extension, external rotation  Strengthening:  Forearm       Progressive Rehabilitation  (2 - 4 weeks) Forward Flexion: 0 - 120°  External Rotation (arm at side):  0 - 15° Self stretching:  As above.  Add cross arm body adduction and functional IR.  Physical Therapy stretching:  Add passive ROM with a physical therapist for forward flexion, ER, and IR.  Strengthening:  As above.  Biceps and Triceps strengthening.  IR and ER isometrics.      Progressive Rehabilitation (4 - 6 weeks) Forward Flexion:  0 -  140°  External Rotation (arm at side):  0 - 35° Self stretching:  As above.  Physical therapy stretching:  As above.  Add 90° abduction and ER as needed.  Strengthening:  Add rotator cuff strengthening (IR, ER, extension) with TB or light weights.  Proprioceptive Ex:  Rhythmic stabilization with physical therapist.  Body blade      Advanced Strengthening (6 - 12 weeks) Forward Flexon:  Full  External Rotation  (arm at side):  Full  (abducted):  0 - 45°    Self stretching:  As needed.  Strengthening:  As above.  Progress to weight machines.  Proprioceptive Ex:  Progress as tolerated   Return to Sport   (12 weeks -   6 months) Full Self stretching:  Before weight lifting/sport as needed  Strengthening:  Progress as tolerated.  No restrictions to rotator cuff strengthening program.  Proprioceptive Ex:  Progress as tolerated.  Light plyometrics (plyoback, physical therapist toss).      Return to pitching   (4 - 6 months) Full Strengthening:  Progress as tolerated.     Throwing Program:  Gradually work towards throwing from the mound.  Cleared for full participation when can throw from mound with good form, ball control, and endurance.      **-Minimum expected range of motion

## 2024-11-11 ENCOUNTER — TREATMENT (OUTPATIENT)
Dept: PHYSICAL THERAPY | Facility: CLINIC | Age: 44
End: 2024-11-11
Payer: COMMERCIAL

## 2024-11-11 DIAGNOSIS — M25.511 CHRONIC RIGHT SHOULDER PAIN: ICD-10-CM

## 2024-11-11 DIAGNOSIS — R29.898 WEAKNESS OF RIGHT SHOULDER: ICD-10-CM

## 2024-11-11 DIAGNOSIS — G89.29 CHRONIC RIGHT SHOULDER PAIN: ICD-10-CM

## 2024-11-11 DIAGNOSIS — S43.431D SUPERIOR GLENOID LABRUM LESION OF RIGHT SHOULDER, SUBSEQUENT ENCOUNTER: ICD-10-CM

## 2024-11-11 DIAGNOSIS — M25.611 DECREASED RANGE OF MOTION OF RIGHT SHOULDER: ICD-10-CM

## 2024-11-11 PROCEDURE — 97140 MANUAL THERAPY 1/> REGIONS: CPT | Mod: GP | Performed by: GENERAL ACUTE CARE HOSPITAL

## 2024-11-11 PROCEDURE — 97110 THERAPEUTIC EXERCISES: CPT | Mod: GP | Performed by: GENERAL ACUTE CARE HOSPITAL

## 2024-11-11 NOTE — PROGRESS NOTES
Patient Name: Ron Clarke  MRN: [id number]  Today's Date: 11/11/2024  Time Calculation  Start Time: 1700  Stop Time: 1740  Time Calculation (min): 40 min  Current Problem:  1. Superior glenoid labrum lesion of right shoulder, subsequent encounter  Follow Up In Physical Therapy      2. Chronic right shoulder pain  Follow Up In Physical Therapy      3. Decreased range of motion of right shoulder  Follow Up In Physical Therapy      4. Weakness of right shoulder  Follow Up In Physical Therapy          Visit 7/20  DOS: 10/4/24 (5 weeks 3 days post op)  SLAP   Subjective:  Change in pain/ pain level: 0/10    Treatment:    Therapeutic exercise (12030):  UBE  Body blade  Wall wash  Wall PU  Reactive ER/IR  Access Code: [email or web address]  URL: [email or web address]  Date: 11/11/2024  Prepared by: Neel Alcazar    Exercises  - Circular Shoulder Pendulum with Table Support  - 1-2 x daily - 7 x weekly - 20 reps  - Standing Shoulder Extension with Dowel  - 1-2 x daily - 7 x weekly - 20 reps  - Standing Shoulder External Rotation AAROM with Dowel (Mirrored)  - 1-2 x daily - 7 x weekly - 20 reps  - Shoulder Flexion Overhead with Dowel (Mirrored)  - 1-2 x daily - 7 x weekly - 20 reps  - Seated Shoulder Flexion AAROM with Pulley Behind  - 1-2 x daily - 7 x weekly - 20 reps  - Standing Shoulder Internal Rotation AAROM with Pulley  - 1-2 x daily - 7 x weekly - 20 reps  - Standing Shoulder Posterior Capsule Stretch  - 1-2 x daily - 7 x weekly - 20 reps  - Standing Shoulder Row with Anchored Resistance  - 1-2 x daily - 7 x weekly - 20 reps  - Shoulder External Rotation with Anchored Resistance  - 1-2 x daily - 7 x weekly - 20 reps  - Shoulder extension with resistance - Neutral  - 1-2 x daily - 7 x weekly - 20 reps  - Shoulder Internal Rotation with Resistance  - 1-2 x daily - 7 x weekly - 20 reps  - Cervical Retraction with Overpressure  - 8 x daily - 7 x weekly - 10 reps  - Seated Cervical Retraction and Extension  -  8 x daily - 7 x weekly - 10 reps  - Prone Shoulder Extension - Single Arm  - 1-2 x daily - 7 x weekly - 20 reps  - Prone Shoulder Row  - 1-2 x daily - 7 x weekly - 20 reps  - Prone Single Arm Shoulder Horizontal Abduction with Scapular Retraction and Palm Down  - 1-2 x daily - 7 x weekly - 20 reps  - Prone Single Arm Shoulder Y  - 1-2 x daily - 7 x weekly - 20 reps  Manual Therapy (87882):  Glenohumeral mobilization A-P Inferior Lateral grade 3/4 Multiple planes  Stretch elevation, IR, ER, cross body  Scapular mobilization all planes/ manual scapular stabilization  TPR subscapularis, Upper Trapezius, Pectoralis major, Pectoralis minor, latissimus dorsi    Assessment:  Patient notes improved pain levels resultant from activities in therapy session. Improved tissue quality noted as well as body mechanics demonstrated after cueing and corrections. Patient continues to require active therapy to progress functional capabilities with decreasing pain levels and improving mechanics with functional activities including progression of Home exercise program. Tolerance to today's treatment was good. Muscle fatigue noted.  Patient appears motivated and compliant this date, demonstrating a working understanding of principals instructed and home program.    Plan:  Progress with functional strength as tolerated with respect to tissue healing. Manual therapy PRN to improve/maintain ROM, prevent adhesion, reduce pain.

## 2024-11-14 ENCOUNTER — TREATMENT (OUTPATIENT)
Dept: PHYSICAL THERAPY | Facility: CLINIC | Age: 44
End: 2024-11-14
Payer: COMMERCIAL

## 2024-11-14 DIAGNOSIS — S43.431D SUPERIOR GLENOID LABRUM LESION OF RIGHT SHOULDER, SUBSEQUENT ENCOUNTER: ICD-10-CM

## 2024-11-14 DIAGNOSIS — M25.611 DECREASED RANGE OF MOTION OF RIGHT SHOULDER: ICD-10-CM

## 2024-11-14 DIAGNOSIS — R29.898 WEAKNESS OF RIGHT SHOULDER: ICD-10-CM

## 2024-11-14 DIAGNOSIS — G89.29 CHRONIC RIGHT SHOULDER PAIN: ICD-10-CM

## 2024-11-14 DIAGNOSIS — M25.511 CHRONIC RIGHT SHOULDER PAIN: ICD-10-CM

## 2024-11-14 PROCEDURE — 97110 THERAPEUTIC EXERCISES: CPT | Mod: GP | Performed by: GENERAL ACUTE CARE HOSPITAL

## 2024-11-14 PROCEDURE — 97140 MANUAL THERAPY 1/> REGIONS: CPT | Mod: GP | Performed by: GENERAL ACUTE CARE HOSPITAL

## 2024-11-14 NOTE — PROGRESS NOTES
Patient Name: Ron Clarke  MRN: [id number]  Today's Date: 11/14/2024  Time Calculation  Start Time: 0745  Stop Time: 0825  Time Calculation (min): 40 min  Current Problem:  1. Superior glenoid labrum lesion of right shoulder, subsequent encounter  Follow Up In Physical Therapy      2. Chronic right shoulder pain  Follow Up In Physical Therapy      3. Decreased range of motion of right shoulder  Follow Up In Physical Therapy      4. Weakness of right shoulder  Follow Up In Physical Therapy          Visit 8/20  DOS: 10/4/24 (5 weeks 6 days post op)  SLAP   Subjective:  Change in pain/ pain level: 0/10    Patient comments: feels pretty good    Treatment:    Therapeutic exercise (27340):    UBE  Body blade  Cable row 2x20   Cable ext 2x20  Ball on wall 4 way  Ball perturbations   Manual Therapy (59453):  Glenohumeral mobilization A-P Inferior Lateral grade 3/4 Multiple planes  Stretch elevation, IR, ER, cross body  Scapular mobilization all planes/ manual scapular stabilization  TPR subscapularis, Upper Trapezius, Pectoralis major, Pectoralis minor, latissimus dorsi    Assessment:   Patient notes improved pain levels resultant from activities in therapy session. Improved tissue quality noted as well as body mechanics demonstrated after cueing and corrections. Patient continues to require active therapy to progress functional capabilities with decreasing pain levels and improving mechanics with functional activities including progression of Home exercise program. Tolerance to today's treatment was good. Muscle fatigue noted.  Patient appears motivated and compliant this date, demonstrating a working understanding of principals instructed and home program.    Plan:  Progress with functional strength as tolerated with respect to tissue healing. Manual therapy PRN to improve/maintain ROM, prevent adhesion, reduce pain.      ROM THERAPEUTIC EXERCISES   Initial post-op (1-14 days) Forward Flexion:  0 - 90°  External Rotation  (arm at side):  0° Self stretching:  Pendulums, Active-assisted shoulder forward flexion, extension, external rotation  Strengthening:  Forearm       Progressive Rehabilitation  (2 - 4 weeks) Forward Flexion: 0 - 120°  External Rotation (arm at side):  0 - 15° Self stretching:  As above.  Add cross arm body adduction and functional IR.  Physical Therapy stretching:  Add passive ROM with a physical therapist for forward flexion, ER, and IR.  Strengthening:  As above.  Biceps and Triceps strengthening.  IR and ER isometrics.      Progressive Rehabilitation (4 - 6 weeks) Forward Flexion:  0 - 140°  External Rotation (arm at side):  0 - 35° Self stretching:  As above.  Physical therapy stretching:  As above.  Add 90° abduction and ER as needed.  Strengthening:  Add rotator cuff strengthening (IR, ER, extension) with TB or light weights.  Proprioceptive Ex:  Rhythmic stabilization with physical therapist.  Body blade      Advanced Strengthening (6 - 12 weeks) Forward Flexon:  Full  External Rotation  (arm at side):  Full  (abducted):  0 - 45°    Self stretching:  As needed.  Strengthening:  As above.  Progress to weight machines.  Proprioceptive Ex:  Progress as tolerated   Return to Sport   (12 weeks -   6 months) Full Self stretching:  Before weight lifting/sport as needed  Strengthening:  Progress as tolerated.  No restrictions to rotator cuff strengthening program.  Proprioceptive Ex:  Progress as tolerated.  Light plyometrics (plyoback, physical therapist toss).      Return to pitching   (4 - 6 months) Full Strengthening:  Progress as tolerated.     Throwing Program:  Gradually work towards throwing from the mound.  Cleared for full participation when can throw from mound with good form, ball control, and endurance.      **-Minimum expected range of motion

## 2024-11-18 ENCOUNTER — TREATMENT (OUTPATIENT)
Dept: PHYSICAL THERAPY | Facility: CLINIC | Age: 44
End: 2024-11-18
Payer: COMMERCIAL

## 2024-11-18 DIAGNOSIS — R29.898 WEAKNESS OF RIGHT SHOULDER: ICD-10-CM

## 2024-11-18 DIAGNOSIS — M25.611 DECREASED RANGE OF MOTION OF RIGHT SHOULDER: ICD-10-CM

## 2024-11-18 DIAGNOSIS — R53.82 CHRONIC FATIGUE, UNSPECIFIED: ICD-10-CM

## 2024-11-18 DIAGNOSIS — E29.1 TESTICULAR HYPOFUNCTION: Primary | ICD-10-CM

## 2024-11-18 DIAGNOSIS — S43.431D SUPERIOR GLENOID LABRUM LESION OF RIGHT SHOULDER, SUBSEQUENT ENCOUNTER: ICD-10-CM

## 2024-11-18 DIAGNOSIS — G89.29 CHRONIC RIGHT SHOULDER PAIN: ICD-10-CM

## 2024-11-18 DIAGNOSIS — M25.511 CHRONIC RIGHT SHOULDER PAIN: ICD-10-CM

## 2024-11-18 PROCEDURE — 97110 THERAPEUTIC EXERCISES: CPT | Mod: GP | Performed by: GENERAL ACUTE CARE HOSPITAL

## 2024-11-18 PROCEDURE — 97140 MANUAL THERAPY 1/> REGIONS: CPT | Mod: GP | Performed by: GENERAL ACUTE CARE HOSPITAL

## 2024-11-18 NOTE — PROGRESS NOTES
Patient Name: Ron Clarke  MRN: [id number]  Today's Date: 11/18/2024  Time Calculation  Start Time: 1700  Stop Time: 1750  Time Calculation (min): 50 min  Current Problem:  1. Superior glenoid labrum lesion of right shoulder, subsequent encounter  Follow Up In Physical Therapy      2. Chronic right shoulder pain  Follow Up In Physical Therapy      3. Decreased range of motion of right shoulder  Follow Up In Physical Therapy      4. Weakness of right shoulder  Follow Up In Physical Therapy          Visit 9/20    Subjective:  Change in pain/ pain level: 1/10  Change in function: sleeping with sling on   Patient comments: not really painful     Objective: right ER 35     Treatment:    Therapeutic exercise (29539):  UBE  Body blade  Cable row 2x20   Cable ext 2x20  Ball on wall 4 way  Ball perturbations   Manual Therapy (37407):  Glenohumeral mobilization A-P Inferior Lateral grade 3/4 Multiple planes  Stretch elevation, IR, ER, cross body  Scapular mobilization all planes/ manual scapular stabilization  TPR subscapularis, Upper Trapezius, Pectoralis major, Pectoralis minor, latissimus dorsi    Assessment:  Patient notes no increased  pain levels resultant from activities in therapy session. Improved tissue quality noted as well as body mechanics demonstrated after cueing and corrections. Patient continues to require active therapy to progress functional capabilities with decreasing pain levels and improving mechanics with functional activities including progression of Home exercise program. Tolerance to today's treatment was good. Muscle fatigue noted.  Patient appears motivated and compliant this date, demonstrating a working understanding of principals instructed and home program.    Plan:  Progress with functional strength as tolerated with respect to tissue healing. Manual therapy PRN to improve/maintain ROM, prevent adhesion, reduce pain.

## 2024-11-20 DIAGNOSIS — S43.431A LABRAL TEAR OF SHOULDER, RIGHT, INITIAL ENCOUNTER: ICD-10-CM

## 2024-11-20 RX ORDER — CYCLOBENZAPRINE HCL 10 MG
10 TABLET ORAL NIGHTLY PRN
Qty: 30 TABLET | Refills: 0 | Status: SHIPPED | OUTPATIENT
Start: 2024-11-20

## 2024-11-21 ENCOUNTER — TREATMENT (OUTPATIENT)
Dept: PHYSICAL THERAPY | Facility: CLINIC | Age: 44
End: 2024-11-21
Payer: COMMERCIAL

## 2024-11-21 ENCOUNTER — LAB (OUTPATIENT)
Dept: LAB | Facility: LAB | Age: 44
End: 2024-11-21
Payer: COMMERCIAL

## 2024-11-21 DIAGNOSIS — R29.898 WEAKNESS OF RIGHT SHOULDER: ICD-10-CM

## 2024-11-21 DIAGNOSIS — R53.82 CHRONIC FATIGUE, UNSPECIFIED: ICD-10-CM

## 2024-11-21 DIAGNOSIS — S43.431D SUPERIOR GLENOID LABRUM LESION OF RIGHT SHOULDER, SUBSEQUENT ENCOUNTER: ICD-10-CM

## 2024-11-21 DIAGNOSIS — G89.29 CHRONIC RIGHT SHOULDER PAIN: ICD-10-CM

## 2024-11-21 DIAGNOSIS — E29.1 TESTICULAR HYPOFUNCTION: ICD-10-CM

## 2024-11-21 DIAGNOSIS — M25.511 CHRONIC RIGHT SHOULDER PAIN: ICD-10-CM

## 2024-11-21 DIAGNOSIS — M25.611 DECREASED RANGE OF MOTION OF RIGHT SHOULDER: ICD-10-CM

## 2024-11-21 LAB
ALBUMIN SERPL BCP-MCNC: 4.2 G/DL (ref 3.4–5)
ALP SERPL-CCNC: 51 U/L (ref 33–120)
ALT SERPL W P-5'-P-CCNC: 15 U/L (ref 10–52)
ANION GAP SERPL CALC-SCNC: 8 MMOL/L (ref 10–20)
AST SERPL W P-5'-P-CCNC: 14 U/L (ref 9–39)
BILIRUB SERPL-MCNC: 0.9 MG/DL (ref 0–1.2)
BUN SERPL-MCNC: 14 MG/DL (ref 6–23)
CALCIUM SERPL-MCNC: 9.4 MG/DL (ref 8.6–10.3)
CHLORIDE SERPL-SCNC: 103 MMOL/L (ref 98–107)
CO2 SERPL-SCNC: 34 MMOL/L (ref 21–32)
CREAT SERPL-MCNC: 0.93 MG/DL (ref 0.5–1.3)
EGFRCR SERPLBLD CKD-EPI 2021: >90 ML/MIN/1.73M*2
ERYTHROCYTE [DISTWIDTH] IN BLOOD BY AUTOMATED COUNT: 12.5 % (ref 11.5–14.5)
GLUCOSE SERPL-MCNC: 94 MG/DL (ref 74–99)
HCT VFR BLD AUTO: 48.7 % (ref 41–52)
HGB BLD-MCNC: 16 G/DL (ref 13.5–17.5)
MCH RBC QN AUTO: 30.3 PG (ref 26–34)
MCHC RBC AUTO-ENTMCNC: 32.9 G/DL (ref 32–36)
MCV RBC AUTO: 92 FL (ref 80–100)
NRBC BLD-RTO: 0 /100 WBCS (ref 0–0)
PLATELET # BLD AUTO: 273 X10*3/UL (ref 150–450)
POTASSIUM SERPL-SCNC: 4.4 MMOL/L (ref 3.5–5.3)
PROLACTIN SERPL-MCNC: 14.2 UG/L (ref 2–18)
PROT SERPL-MCNC: 6.8 G/DL (ref 6.4–8.2)
PSA SERPL-MCNC: 0.74 NG/ML
RBC # BLD AUTO: 5.28 X10*6/UL (ref 4.5–5.9)
SODIUM SERPL-SCNC: 141 MMOL/L (ref 136–145)
T4 FREE SERPL-MCNC: 1.19 NG/DL (ref 0.61–1.12)
TSH SERPL-ACNC: 4.64 MIU/L (ref 0.44–3.98)
WBC # BLD AUTO: 5.4 X10*3/UL (ref 4.4–11.3)

## 2024-11-21 PROCEDURE — 84153 ASSAY OF PSA TOTAL: CPT

## 2024-11-21 PROCEDURE — 80053 COMPREHEN METABOLIC PANEL: CPT

## 2024-11-21 PROCEDURE — 85027 COMPLETE CBC AUTOMATED: CPT

## 2024-11-21 PROCEDURE — 84443 ASSAY THYROID STIM HORMONE: CPT

## 2024-11-21 PROCEDURE — 36415 COLL VENOUS BLD VENIPUNCTURE: CPT

## 2024-11-21 PROCEDURE — 97140 MANUAL THERAPY 1/> REGIONS: CPT | Mod: GP | Performed by: GENERAL ACUTE CARE HOSPITAL

## 2024-11-21 PROCEDURE — 84439 ASSAY OF FREE THYROXINE: CPT

## 2024-11-21 PROCEDURE — 84402 ASSAY OF FREE TESTOSTERONE: CPT

## 2024-11-21 PROCEDURE — 97110 THERAPEUTIC EXERCISES: CPT | Mod: GP | Performed by: GENERAL ACUTE CARE HOSPITAL

## 2024-11-21 PROCEDURE — 84146 ASSAY OF PROLACTIN: CPT

## 2024-11-21 NOTE — PROGRESS NOTES
Patient Name: Ron Clarke  MRN: [id number]  Today's Date: 11/21/2024  Time Calculation  Start Time: 0740  Stop Time: 0820  Time Calculation (min): 40 min  Current Problem:  1. Superior glenoid labrum lesion of right shoulder, subsequent encounter  Follow Up In Physical Therapy      2. Chronic right shoulder pain  Follow Up In Physical Therapy      3. Decreased range of motion of right shoulder  Follow Up In Physical Therapy      4. Weakness of right shoulder  Follow Up In Physical Therapy          Visit 10/20    Subjective:  Change in pain/ pain level: 0/10  Patient comments: wrist still hurts shoulder is OK still sleeping in sling     Treatment:    Therapeutic exercise (05455): UBE  Body blade  Cable row 2x20   Cable ext 2x20  Cable pull down  Ball on wall 4 way  Ball perturbations  Side lying ER Abd habd flex   Supine punch alpha    Manual Therapy (00876):  Glenohumeral mobilization A-P Inferior Lateral grade 3/4 Multiple planes  Stretch elevation, IR, ER, cross body  Scapular mobilization all planes/ manual scapular stabilization  TPR subscapularis, Upper Trapezius, Pectoralis major, Pectoralis minor, latissimus dorsi   wrist whip modified for shoulder     Assessment:  Patient notes improved pain levels resultant from activities in therapy session. Improved tissue quality noted as well as body mechanics demonstrated after cueing and corrections. Patient continues to require active therapy to progress functional capabilities with decreasing pain levels and improving mechanics with functional activities including progression of Home exercise program. Tolerance to today's treatment was good. Muscle fatigue noted.  Patient appears motivated and compliant this date, demonstrating a working understanding of principals instructed and home program.    Plan:  Progress with functional strength as tolerated with respect to tissue healing. Manual therapy PRN to improve/maintain ROM, prevent adhesion, reduce pain.

## 2024-11-25 ENCOUNTER — TREATMENT (OUTPATIENT)
Dept: PHYSICAL THERAPY | Facility: CLINIC | Age: 44
End: 2024-11-25
Payer: COMMERCIAL

## 2024-11-25 DIAGNOSIS — R29.898 WEAKNESS OF RIGHT SHOULDER: ICD-10-CM

## 2024-11-25 DIAGNOSIS — G89.29 CHRONIC RIGHT SHOULDER PAIN: ICD-10-CM

## 2024-11-25 DIAGNOSIS — M25.511 CHRONIC RIGHT SHOULDER PAIN: ICD-10-CM

## 2024-11-25 DIAGNOSIS — S43.431D SUPERIOR GLENOID LABRUM LESION OF RIGHT SHOULDER, SUBSEQUENT ENCOUNTER: ICD-10-CM

## 2024-11-25 DIAGNOSIS — M25.611 DECREASED RANGE OF MOTION OF RIGHT SHOULDER: ICD-10-CM

## 2024-11-25 LAB
TESTOSTERONE FREE (CHAN): 185.4 PG/ML (ref 35–155)
TESTOSTERONE,TOTAL,LC-MS/MS: 1002 NG/DL (ref 250–1100)

## 2024-11-25 PROCEDURE — 97110 THERAPEUTIC EXERCISES: CPT | Mod: GP | Performed by: GENERAL ACUTE CARE HOSPITAL

## 2024-11-25 PROCEDURE — 97140 MANUAL THERAPY 1/> REGIONS: CPT | Mod: GP | Performed by: GENERAL ACUTE CARE HOSPITAL

## 2024-11-25 NOTE — PROGRESS NOTES
Patient Name: Ron Calrke  MRN: [id number]  Today's Date: 11/25/2024  Time Calculation  Start Time: 1656  Stop Time: 1738  Time Calculation (min): 42 min  Current Problem:  1. Superior glenoid labrum lesion of right shoulder, subsequent encounter  Follow Up In Physical Therapy      2. Chronic right shoulder pain  Follow Up In Physical Therapy      3. Decreased range of motion of right shoulder  Follow Up In Physical Therapy      4. Weakness of right shoulder  Follow Up In Physical Therapy          Visit 11/20  DOS: 10/4/24 (7 weeks 3 days post op)  SLAP   Subjective:  Change in pain/ pain level: 0/10  Change in function: did a lot of yard work, avoiding lifting   Patient comments: cross body stretch hurts     Objective: elevation 155     Treatment:    Therapeutic exercise (44670):    UBE  Body blade 6 way   Push up on table  Cable row 2x20   Cable ext 2x20  Cable pull down  Ball on wall 4 way  Ball perturbations  Side lying ER Abd habd flex   Supine punch alpha    Access Code: [email or web address]  URL: [email or web address]  Date: 11/25/2024  Prepared by: Neel Alcazar    Exercises  - Circular Shoulder Pendulum with Table Support  - 1-2 x daily - 7 x weekly - 20 reps  - Standing Shoulder Extension with Dowel  - 1-2 x daily - 7 x weekly - 20 reps  - Standing Shoulder External Rotation AAROM with Dowel (Mirrored)  - 1-2 x daily - 7 x weekly - 20 reps  - Shoulder Flexion Overhead with Dowel (Mirrored)  - 1-2 x daily - 7 x weekly - 20 reps  - Seated Shoulder Flexion AAROM with Pulley Behind  - 1-2 x daily - 7 x weekly - 20 reps  - Standing Shoulder Internal Rotation AAROM with Pulley  - 1-2 x daily - 7 x weekly - 20 reps  - Standing Shoulder Posterior Capsule Stretch  - 1-2 x daily - 7 x weekly - 20 reps  - Standing Shoulder Row with Anchored Resistance  - 1-2 x daily - 7 x weekly - 20 reps  - Shoulder External Rotation with Anchored Resistance  - 1-2 x daily - 7 x weekly - 20 reps  - Shoulder extension  with resistance - Neutral  - 1-2 x daily - 7 x weekly - 20 reps  - Shoulder Internal Rotation with Resistance  - 1-2 x daily - 7 x weekly - 20 reps  - Cervical Retraction with Overpressure  - 8 x daily - 7 x weekly - 10 reps  - Seated Cervical Retraction and Extension  - 8 x daily - 7 x weekly - 10 reps  - Prone Shoulder Extension - Single Arm  - 1-2 x daily - 7 x weekly - 20 reps  - Prone Shoulder Row  - 1-2 x daily - 7 x weekly - 20 reps  - Prone Single Arm Shoulder Horizontal Abduction with Scapular Retraction and Palm Down  - 1-2 x daily - 7 x weekly - 20 reps  - Prone Single Arm Shoulder Y  - 1-2 x daily - 7 x weekly - 20 reps  - Sidelying Shoulder ER with Towel and Dumbbell  - 1-2 x daily - 7 x weekly - 20 reps  - Sidelying Shoulder Abduction Palm Forward  - 1-2 x daily - 7 x weekly - 20 reps  - Sidelying Shoulder Horizontal Abduction  - 1-2 x daily - 7 x weekly - 20 reps  - Sidelying Shoulder Flexion 15 Degrees  - 1-2 x daily - 7 x weekly - 20 reps  - Supine Shoulder Alphabet  - 1-2 x daily - 7 x weekly - 20 reps  - Supine Scapular Protraction in Flexion with Dumbbells  - 1-2 x daily - 7 x weekly - 20 reps  Manual Therapy (43682):  Glenohumeral mobilization A-P Inferior Lateral grade 3/4 Multiple planes  Stretch elevation, IR, ER, cross body  Scapular mobilization all planes/ manual scapular stabilization  TPR subscapularis, Upper Trapezius, Pectoralis major, Pectoralis minor, latissimus dorsi    Assessment:  Empty end feel in flexion. Patient notes improved pain levels resultant from activities in therapy session. Improved tissue quality noted as well as body mechanics demonstrated after cueing and corrections. Patient continues to require active therapy to progress functional capabilities with decreasing pain levels and improving mechanics with functional activities including progression of Home exercise program. Tolerance to today's treatment was good. Muscle fatigue noted.  Patient appears motivated and  compliant this date, demonstrating a working understanding of principals instructed and home program.    Plan:  Progress with functional strength as tolerated with respect to tissue healing. Manual therapy PRN to improve/maintain ROM, prevent adhesion, reduce pain.

## 2024-12-02 ENCOUNTER — TREATMENT (OUTPATIENT)
Dept: PHYSICAL THERAPY | Facility: CLINIC | Age: 44
End: 2024-12-02
Payer: COMMERCIAL

## 2024-12-02 DIAGNOSIS — G89.29 CHRONIC RIGHT SHOULDER PAIN: ICD-10-CM

## 2024-12-02 DIAGNOSIS — R29.898 WEAKNESS OF RIGHT SHOULDER: ICD-10-CM

## 2024-12-02 DIAGNOSIS — S43.431D SUPERIOR GLENOID LABRUM LESION OF RIGHT SHOULDER, SUBSEQUENT ENCOUNTER: ICD-10-CM

## 2024-12-02 DIAGNOSIS — M25.511 CHRONIC RIGHT SHOULDER PAIN: ICD-10-CM

## 2024-12-02 DIAGNOSIS — M25.611 DECREASED RANGE OF MOTION OF RIGHT SHOULDER: ICD-10-CM

## 2024-12-02 PROCEDURE — 97140 MANUAL THERAPY 1/> REGIONS: CPT | Mod: GP | Performed by: GENERAL ACUTE CARE HOSPITAL

## 2024-12-02 PROCEDURE — 97110 THERAPEUTIC EXERCISES: CPT | Mod: GP | Performed by: GENERAL ACUTE CARE HOSPITAL

## 2024-12-02 NOTE — PROGRESS NOTES
Patient Name: Ron Clarke  MRN: [id number]  Today's Date: 12/2/2024  Time Calculation  Start Time: 1700  Stop Time: 1745  Time Calculation (min): 45 min  Current Problem:  1. Superior glenoid labrum lesion of right shoulder, subsequent encounter  Follow Up In Physical Therapy      2. Chronic right shoulder pain  Follow Up In Physical Therapy      3. Decreased range of motion of right shoulder  Follow Up In Physical Therapy      4. Weakness of right shoulder  Follow Up In Physical Therapy          Visit 12/20  DOS: 10/4/24 (7 weeks 3 days post op)  SLAP   Subjective:  Change in pain/ pain level: 0/10  Change in function: weak- can not lift the turkey without shaking   Treatment:    Therapeutic exercise (47770):    UBE  Body blade 6 way   Push up on table  Cable row 2x20   Cable ext 2x20  Cable pull down  Ball on wall 4 way  Ball perturbations  Side lying ER Abd habd flex   Supine punch alpha    Manual Therapy (37022):  Glenohumeral mobilization A-P Inferior Lateral grade 3/4 Multiple planes  Stretch elevation, IR, ER, cross body  Scapular mobilization all planes/ manual scapular stabilization  TPR subscapularis, Upper Trapezius, Pectoralis major, Pectoralis minor, latissimus dorsi    Assessment:  Patient notes improved pain levels resultant from activities in therapy session. Improved tissue quality noted as well as body mechanics demonstrated after cueing and corrections. Patient continues to require active therapy to progress functional capabilities with decreasing pain levels and improving mechanics with functional activities including progression of Home exercise program. Tolerance to today's treatment was good. Muscle fatigue noted.  Patient appears motivated and compliant this date, demonstrating a working understanding of principals instructed and home program.    Plan:  Progress with functional strength as tolerated with respect to tissue healing. Manual therapy PRN to improve/maintain ROM, prevent adhesion,  reduce pain.

## 2024-12-04 ENCOUNTER — OFFICE VISIT (OUTPATIENT)
Dept: ORTHOPEDIC SURGERY | Facility: CLINIC | Age: 44
End: 2024-12-04
Payer: COMMERCIAL

## 2024-12-04 DIAGNOSIS — G89.18 POST-OP PAIN: Primary | ICD-10-CM

## 2024-12-04 PROCEDURE — 1036F TOBACCO NON-USER: CPT | Performed by: ORTHOPAEDIC SURGERY

## 2024-12-04 PROCEDURE — 99211 OFF/OP EST MAY X REQ PHY/QHP: CPT | Performed by: ORTHOPAEDIC SURGERY

## 2024-12-04 PROCEDURE — 99024 POSTOP FOLLOW-UP VISIT: CPT | Performed by: ORTHOPAEDIC SURGERY

## 2024-12-04 NOTE — PROGRESS NOTES
2  History of Present Illness:  Patient returns today after shoulder arthroscopy.  The patient denies any significant pain at rest but does have pain with motion and activities.     Physical Examination:  Well healed incisions, no significant swelling   Forward flexion: full  Rotation decreased versus contralateral side  Neurovascular exam normal distally    Assessment:  Patient status post right shoulder arthroscopy with labrum repair    Plan:  Discussed transition out of sling.  Discussed physical therapy protocol and activity restrictions.  Follow-up ~ 6-8 weeks.     Rayray Devries PA-C     In a face to face encounter, I evaluated the patient and performed a physical examination, discussed pertinent diagnostic studies if indicated and discussed diagnosis and management strategies with both the patient and physician assistant / nurse practitioner.  I reviewed the PA/NP's note and agree with the documented findings and plan of care.        Arjun Murphy MD

## 2024-12-05 ENCOUNTER — APPOINTMENT (OUTPATIENT)
Dept: PHYSICAL THERAPY | Facility: CLINIC | Age: 44
End: 2024-12-05
Payer: COMMERCIAL

## 2024-12-09 ENCOUNTER — APPOINTMENT (OUTPATIENT)
Dept: PHYSICAL THERAPY | Facility: CLINIC | Age: 44
End: 2024-12-09
Payer: COMMERCIAL

## 2024-12-12 ENCOUNTER — APPOINTMENT (OUTPATIENT)
Dept: PHYSICAL THERAPY | Facility: CLINIC | Age: 44
End: 2024-12-12
Payer: COMMERCIAL

## 2024-12-16 ENCOUNTER — TREATMENT (OUTPATIENT)
Dept: PHYSICAL THERAPY | Facility: CLINIC | Age: 44
End: 2024-12-16
Payer: COMMERCIAL

## 2024-12-16 DIAGNOSIS — M25.511 CHRONIC RIGHT SHOULDER PAIN: ICD-10-CM

## 2024-12-16 DIAGNOSIS — M25.611 DECREASED RANGE OF MOTION OF RIGHT SHOULDER: ICD-10-CM

## 2024-12-16 DIAGNOSIS — S43.431D SUPERIOR GLENOID LABRUM LESION OF RIGHT SHOULDER, SUBSEQUENT ENCOUNTER: ICD-10-CM

## 2024-12-16 DIAGNOSIS — R29.898 WEAKNESS OF RIGHT SHOULDER: ICD-10-CM

## 2024-12-16 DIAGNOSIS — G89.29 CHRONIC RIGHT SHOULDER PAIN: ICD-10-CM

## 2024-12-16 PROCEDURE — 97110 THERAPEUTIC EXERCISES: CPT | Mod: GP | Performed by: GENERAL ACUTE CARE HOSPITAL

## 2024-12-16 PROCEDURE — 97140 MANUAL THERAPY 1/> REGIONS: CPT | Mod: GP | Performed by: GENERAL ACUTE CARE HOSPITAL

## 2024-12-16 NOTE — PROGRESS NOTES
Patient Name: Ron Clarke  MRN: [id number]  Today's Date: 12/16/2024  Time Calculation  Start Time: 1700  Stop Time: 1746  Time Calculation (min): 46 min  Current Problem:  1. Superior glenoid labrum lesion of right shoulder, subsequent encounter  Follow Up In Physical Therapy      2. Chronic right shoulder pain  Follow Up In Physical Therapy      3. Decreased range of motion of right shoulder  Follow Up In Physical Therapy      4. Weakness of right shoulder  Follow Up In Physical Therapy          Visit 13/20  DOS: 10/4/24 (9 weeks 3 days post op) SLAP   Subjective:  Change in pain/ pain level: 0/10    Patient comments: shoulder is good nights are painful       Treatment:    Therapeutic exercise (88053):  body blade UBE throwers 10   Manual Therapy (97804):  Glenohumeral mobilization A-P Inferior Lateral grade 3/4 Multiple planes  Stretch elevation, IR, ER, cross body  Scapular mobilization all planes/ manual scapular stabilization  TPR subscapularis, Upper Trapezius, Pectoralis major, Pectoralis minor, latissimus dorsi    Assessment:  Patient notes improved pain levels resultant from activities in therapy session. Improved tissue quality noted as well as body mechanics demonstrated after cueing and corrections. Patient continues to require active therapy to progress functional capabilities with decreasing pain levels and improving mechanics with functional activities including progression of Home exercise program. Tolerance to today's treatment was good. Muscle fatigue noted.  Patient appears motivated and compliant this date, demonstrating a working understanding of principals instructed and home program.    Plan:  Progress with functional strength as tolerated with respect to tissue healing. Manual therapy PRN to improve/maintain ROM, prevent adhesion, reduce pain.

## 2024-12-23 ENCOUNTER — TREATMENT (OUTPATIENT)
Dept: PHYSICAL THERAPY | Facility: CLINIC | Age: 44
End: 2024-12-23
Payer: COMMERCIAL

## 2024-12-23 DIAGNOSIS — R29.898 WEAKNESS OF RIGHT SHOULDER: ICD-10-CM

## 2024-12-23 DIAGNOSIS — S43.431D SUPERIOR GLENOID LABRUM LESION OF RIGHT SHOULDER, SUBSEQUENT ENCOUNTER: ICD-10-CM

## 2024-12-23 DIAGNOSIS — M25.511 CHRONIC RIGHT SHOULDER PAIN: ICD-10-CM

## 2024-12-23 DIAGNOSIS — M25.611 DECREASED RANGE OF MOTION OF RIGHT SHOULDER: ICD-10-CM

## 2024-12-23 DIAGNOSIS — G89.29 CHRONIC RIGHT SHOULDER PAIN: ICD-10-CM

## 2024-12-23 PROCEDURE — 97140 MANUAL THERAPY 1/> REGIONS: CPT | Mod: GP | Performed by: GENERAL ACUTE CARE HOSPITAL

## 2024-12-23 PROCEDURE — 97110 THERAPEUTIC EXERCISES: CPT | Mod: GP | Performed by: GENERAL ACUTE CARE HOSPITAL

## 2024-12-23 NOTE — PROGRESS NOTES
Patient Name: Ron Clarke  MRN: [id number]  Today's Date: 12/23/2024  Time Calculation  Start Time: 1700  Stop Time: 1743  Time Calculation (min): 43 min  Current Problem:  1. Superior glenoid labrum lesion of right shoulder, subsequent encounter  Follow Up In Physical Therapy      2. Chronic right shoulder pain  Follow Up In Physical Therapy      3. Decreased range of motion of right shoulder  Follow Up In Physical Therapy      4. Weakness of right shoulder  Follow Up In Physical Therapy          Visit 14/20  DOS: 10/4/24 (10 weeks 3 days post op) SLAP     Subjective:  Change in pain/ pain level: 0/10      Treatment:    Therapeutic exercise (80148):    UBE  Body blade 3 way mod  High plank 30 sec x3  Quad tband side step green x20 ea  Bear crawl   Cahir push up/ dips   Bear crawl  Tilt board L3   Manual Therapy (58263):  Glenohumeral mobilization A-P Inferior Lateral grade 3/4 Multiple planes  Stretch elevation, IR, ER, cross body  Scapular mobilization all planes/ manual scapular stabilization  TPR subscapularis, Upper Trapezius, Pectoralis major, Pectoralis minor, latissimus dorsi    Assessment:  Patient notes improved pain levels resultant from activities in therapy session. Improved tissue quality noted as well as body mechanics demonstrated after cueing and corrections. Patient continues to require active therapy to progress functional capabilities with decreasing pain levels and improving mechanics with functional activities including progression of Home exercise program. Tolerance to today's treatment was good. Muscle fatigue noted.  Patient appears motivated and compliant this date, demonstrating a working understanding of principals instructed and home program.    Plan:  Progress with functional strength as tolerated with respect to tissue healing. Manual therapy PRN to improve/maintain ROM, prevent adhesion, reduce pain.

## 2024-12-30 ENCOUNTER — APPOINTMENT (OUTPATIENT)
Dept: PHYSICAL THERAPY | Facility: CLINIC | Age: 44
End: 2024-12-30
Payer: COMMERCIAL

## 2025-01-28 ENCOUNTER — TREATMENT (OUTPATIENT)
Dept: PHYSICAL THERAPY | Facility: CLINIC | Age: 45
End: 2025-01-28
Payer: COMMERCIAL

## 2025-01-28 DIAGNOSIS — R29.898 WEAKNESS OF RIGHT SHOULDER: ICD-10-CM

## 2025-01-28 DIAGNOSIS — M25.611 DECREASED RANGE OF MOTION OF RIGHT SHOULDER: ICD-10-CM

## 2025-01-28 DIAGNOSIS — M25.511 CHRONIC RIGHT SHOULDER PAIN: ICD-10-CM

## 2025-01-28 DIAGNOSIS — G89.29 CHRONIC RIGHT SHOULDER PAIN: ICD-10-CM

## 2025-01-28 DIAGNOSIS — S43.431D SUPERIOR GLENOID LABRUM LESION OF RIGHT SHOULDER, SUBSEQUENT ENCOUNTER: ICD-10-CM

## 2025-01-28 PROCEDURE — 97110 THERAPEUTIC EXERCISES: CPT | Mod: GP | Performed by: GENERAL ACUTE CARE HOSPITAL

## 2025-01-28 PROCEDURE — 97140 MANUAL THERAPY 1/> REGIONS: CPT | Mod: GP | Performed by: GENERAL ACUTE CARE HOSPITAL

## 2025-01-28 NOTE — PROGRESS NOTES
Patient Name: Ron Clarke  MRN: [id number]  Today's Date: 1/28/2025  Time Calculation  Start Time: 0745  Stop Time: 0830  Time Calculation (min): 45 min  Current Problem:  1. Superior glenoid labrum lesion of right shoulder, subsequent encounter  Follow Up In Physical Therapy      2. Chronic right shoulder pain  Follow Up In Physical Therapy      3. Decreased range of motion of right shoulder  Follow Up In Physical Therapy      4. Weakness of right shoulder  Follow Up In Physical Therapy          Visit 15/20    Subjective:  Change in pain/ pain level: 0/10  Change in function: taking golf swing but not making contact   Patient comments: exercises getting easier, want to return to the gym     Objective: ER 80 ERP elevation 175 ERP     Treatment:    Therapeutic exercise (21722):    UBE 5 min  Access Code: [email or web address]  URL: [email or web address]  Date: 01/28/2025  Prepared by: Neel Alcazar    Exercises  - Standing Shoulder Internal Rotation AAROM with Pulley  - 1-2 x daily - 7 x weekly - 20 reps  - Standing Shoulder Posterior Capsule Stretch  - 1-2 x daily - 7 x weekly - 30 hold  - Cervical Retraction with Overpressure  - 8 x daily - 7 x weekly - 10 reps  - Seated Cervical Retraction and Extension  - 8 x daily - 7 x weekly - 10 reps  - Standing Shoulder Row with Anchored Resistance  - 1-2 x daily - 7 x weekly - 20 reps  - Shoulder External Rotation with Anchored Resistance  - 1-2 x daily - 7 x weekly - 20 reps  - Shoulder extension with resistance - Neutral  - 1-2 x daily - 7 x weekly - 20 reps  - Shoulder Internal Rotation with Resistance  - 1-2 x daily - 7 x weekly - 20 reps  - Prone Shoulder Extension - Single Arm  - 1-2 x daily - 7 x weekly - 20 reps  - Prone Shoulder Row  - 1-2 x daily - 7 x weekly - 20 reps  - Prone Single Arm Shoulder Horizontal Abduction with Scapular Retraction and Palm Down  - 1-2 x daily - 7 x weekly - 20 reps  - Prone Single Arm Shoulder Y  - 1-2 x daily - 7 x weekly  - 20 reps  - Sidelying Shoulder ER with Towel and Dumbbell  - 1-2 x daily - 7 x weekly - 20 reps  - Sidelying Shoulder Abduction Palm Forward  - 1-2 x daily - 7 x weekly - 20 reps  - Sidelying Shoulder Horizontal Abduction  - 1-2 x daily - 7 x weekly - 20 reps  - Sidelying Shoulder Flexion 15 Degrees  - 1-2 x daily - 7 x weekly - 20 reps  - Supine Shoulder Alphabet  - 1-2 x daily - 7 x weekly - 20 reps  - Supine Scapular Protraction in Flexion with Dumbbells  - 1-2 x daily - 7 x weekly - 20 reps  - Seated Chair Push Ups  - 1-2 x daily - 7 x weekly - 20 reps  - Shoulder PNF D2 with Resistance  - 1-2 x daily - 7 x weekly - 20 reps  - Standing Single Arm Shoulder PNF D1 Extension with Anchored Resistance  - 1-2 x daily - 7 x weekly - 20 reps  - Standing Single Arm Shoulder PNF D1 Flexion with Anchored Resistance  - 1-2 x daily - 7 x weekly - 20 reps  - Standing Single Arm Shoulder External Rotation in Abduction with Anchored Resistance  - 1-2 x daily - 7 x weekly - 20 reps  - Standing Ball Toss at Wall  - 1-2 x daily - 7 x weekly - 20 reps  - Side Plank on Knees  - 1-2 x daily - 7 x weekly - 30 hold  - Standard Plank  - 1-2 x daily - 7 x weekly - 60 hold  - Push Up on Table  - 1-2 x daily - 7 x weekly - 20 reps  Manual Therapy (50820):  Glenohumeral mobilization A-P Inferior Lateral grade 3/4 Multiple planes  Stretch elevation, IR, ER, cross body  Scapular mobilization all planes/ manual scapular stabilization  TPR subscapularis, Upper Trapezius, Pectoralis major, Pectoralis minor, latissimus dorsi  Assessment:  Noted popping with throwing, slow down mechanics and resolved.  Patient notes improved pain levels resultant from activities in therapy session. Improved tissue quality noted as well as body mechanics demonstrated after cueing and corrections. Patient continues to require active therapy to progress functional capabilities with decreasing pain levels and improving mechanics with functional activities including  progression of Home exercise program. Tolerance to today's treatment was good. Muscle fatigue noted.  Patient appears motivated and compliant this date, demonstrating a working understanding of principals instructed and home program.    Plan:  Progress with functional strength as tolerated with respect to tissue healing. Manual therapy PRN to improve/maintain ROM, prevent adhesion, reduce pain.

## 2025-02-05 ENCOUNTER — OFFICE VISIT (OUTPATIENT)
Dept: ORTHOPEDIC SURGERY | Facility: CLINIC | Age: 45
End: 2025-02-05
Payer: COMMERCIAL

## 2025-02-05 DIAGNOSIS — S43.431S LABRAL TEAR OF SHOULDER, RIGHT, SEQUELA: Primary | ICD-10-CM

## 2025-02-05 PROCEDURE — 99213 OFFICE O/P EST LOW 20 MIN: CPT | Performed by: ORTHOPAEDIC SURGERY

## 2025-02-05 PROCEDURE — 1036F TOBACCO NON-USER: CPT | Performed by: ORTHOPAEDIC SURGERY

## 2025-02-05 NOTE — PROGRESS NOTES
History of Present Illness:  Patient returns today after shoulder arthroscopy doing well.  The patient endorses good relief of pre-operative symptoms and increasing activity level.     Physical Examination:  Well healed incisions  Forward flexion / External rotation similar to contralateral side  No significant deficits in rotator cuff strength testing  Neurovascular exam normal distally    Assessment:  Patient status post right shoulder arthroscopy circumferential labral tear     Plan:  Discussed home exercise program and activities to avoid.  Discussed return to activities.  Reviewed NSAIDS for occasional pain, discussed the risks and benefits.  Follow-up: Not needed at this time

## 2025-02-10 ENCOUNTER — APPOINTMENT (OUTPATIENT)
Dept: PHYSICAL THERAPY | Facility: CLINIC | Age: 45
End: 2025-02-10
Payer: COMMERCIAL

## 2025-03-03 ENCOUNTER — TREATMENT (OUTPATIENT)
Dept: PHYSICAL THERAPY | Facility: CLINIC | Age: 45
End: 2025-03-03
Payer: COMMERCIAL

## 2025-03-03 DIAGNOSIS — M25.511 CHRONIC RIGHT SHOULDER PAIN: ICD-10-CM

## 2025-03-03 DIAGNOSIS — M25.611 DECREASED RANGE OF MOTION OF RIGHT SHOULDER: ICD-10-CM

## 2025-03-03 DIAGNOSIS — S43.431D SUPERIOR GLENOID LABRUM LESION OF RIGHT SHOULDER, SUBSEQUENT ENCOUNTER: ICD-10-CM

## 2025-03-03 DIAGNOSIS — R29.898 WEAKNESS OF RIGHT SHOULDER: ICD-10-CM

## 2025-03-03 DIAGNOSIS — G89.29 CHRONIC RIGHT SHOULDER PAIN: ICD-10-CM

## 2025-03-03 PROCEDURE — 97110 THERAPEUTIC EXERCISES: CPT | Mod: GP | Performed by: GENERAL ACUTE CARE HOSPITAL

## 2025-03-03 PROCEDURE — 97140 MANUAL THERAPY 1/> REGIONS: CPT | Mod: GP | Performed by: GENERAL ACUTE CARE HOSPITAL

## 2025-03-03 NOTE — PROGRESS NOTES
Patient Name: Ron Clarke  MRN: [id number]  Today's Date: 3/3/2025  Time Calculation  Start Time: 1700  Stop Time: 1750  Time Calculation (min): 50 min  Current Problem:  1. Superior glenoid labrum lesion of right shoulder, subsequent encounter  Follow Up In Physical Therapy      2. Chronic right shoulder pain  Follow Up In Physical Therapy      3. Decreased range of motion of right shoulder  Follow Up In Physical Therapy      4. Weakness of right shoulder  Follow Up In Physical Therapy          Visit 16/20    Subjective:  Change in pain/ pain level: 0/10  Change in function: golf  can't swing through- cross body   Patient comments: some soreness from return to gym. MD cleared me. Want to throw    Treatment:    Therapeutic exercise (22990):  Access Code: [email or web address]  URL: [email or web address]  Date: 03/03/2025  Prepared by: Neel Alcazar    Exercises  - Standing Shoulder Internal Rotation AAROM with Pulley  - 1-2 x daily - 7 x weekly - 20 reps  - Standing Shoulder Posterior Capsule Stretch  - 1-2 x daily - 7 x weekly - 30 hold  - Cervical Retraction with Overpressure  - 8 x daily - 7 x weekly - 10 reps  - Seated Cervical Retraction and Extension  - 8 x daily - 7 x weekly - 10 reps  - Standing Shoulder Row with Anchored Resistance  - 1-2 x daily - 7 x weekly - 20 reps  - Shoulder External Rotation with Anchored Resistance  - 1-2 x daily - 7 x weekly - 20 reps  - Shoulder extension with resistance - Neutral  - 1-2 x daily - 7 x weekly - 20 reps  - Shoulder Internal Rotation with Resistance  - 1-2 x daily - 7 x weekly - 20 reps  - Prone Shoulder Extension - Single Arm  - 1-2 x daily - 7 x weekly - 20 reps  - Prone Shoulder Row  - 1-2 x daily - 7 x weekly - 20 reps  - Prone Single Arm Shoulder Horizontal Abduction with Scapular Retraction and Palm Down  - 1-2 x daily - 7 x weekly - 20 reps  - Prone Single Arm Shoulder Y  - 1-2 x daily - 7 x weekly - 20 reps  - Sidelying Shoulder ER with  Towel and Dumbbell  - 1-2 x daily - 7 x weekly - 20 reps  - Sidelying Shoulder Abduction Palm Forward  - 1-2 x daily - 7 x weekly - 20 reps  - Sidelying Shoulder Horizontal Abduction  - 1-2 x daily - 7 x weekly - 20 reps  - Sidelying Shoulder Flexion 15 Degrees  - 1-2 x daily - 7 x weekly - 20 reps  - Supine Shoulder Alphabet  - 1-2 x daily - 7 x weekly - 20 reps  - Supine Scapular Protraction in Flexion with Dumbbells  - 1-2 x daily - 7 x weekly - 20 reps  - Seated Chair Push Ups  - 1-2 x daily - 7 x weekly - 20 reps  - Shoulder PNF D2 with Resistance  - 1-2 x daily - 7 x weekly - 20 reps  - Standing Single Arm Shoulder PNF D1 Extension with Anchored Resistance  - 1-2 x daily - 7 x weekly - 20 reps  - Standing Single Arm Shoulder PNF D1 Flexion with Anchored Resistance  - 1-2 x daily - 7 x weekly - 20 reps  - Standing Single Arm Shoulder External Rotation in Abduction with Anchored Resistance  - 1-2 x daily - 7 x weekly - 20 reps  - Standing Ball Toss at Wall  - 1-2 x daily - 7 x weekly - 20 reps  - Side Plank on Knees  - 1-2 x daily - 7 x weekly - 30 hold  - Standard Plank  - 1-2 x daily - 7 x weekly - 60 hold  - Push Up on Table  - 1-2 x daily - 7 x weekly - 20 reps  Manual Therapy (63227):  Glenohumeral mobilization A-P Inferior Lateral grade 3/4 Multiple planes  Stretch elevation, IR, ER, cross body  Scapular mobilization all planes/ manual scapular stabilization  TPR subscapularis, Upper Trapezius, Pectoralis major, Pectoralis minor, latissimus dorsi    Assessment:  issued RETURN TO THROW PROGRAM, PATIENT UNDERSTANDS PRINCIPALS OF PROGRESSION AND REGRESSION. Patient notes improved pain levels resultant from activities in therapy session. Improved tissue quality noted as well as body mechanics demonstrated after cueing and corrections. Patient continues to require active therapy to progress functional capabilities with decreasing pain levels and improving mechanics with functional activities including  progression of Home exercise program. Tolerance to today's treatment was good. Muscle fatigue noted.  Patient appears motivated and compliant this date, demonstrating a working understanding of principals instructed and home program.    Plan: er AND CROSS BODY   Progress with functional strength as tolerated with respect to tissue healing. Manual therapy PRN to improve/maintain ROM, prevent adhesion, reduce pain.

## 2025-03-24 ENCOUNTER — APPOINTMENT (OUTPATIENT)
Dept: PHYSICAL THERAPY | Facility: CLINIC | Age: 45
End: 2025-03-24
Payer: COMMERCIAL

## 2025-04-23 ENCOUNTER — TREATMENT (OUTPATIENT)
Dept: PHYSICAL THERAPY | Facility: CLINIC | Age: 45
End: 2025-04-23
Payer: COMMERCIAL

## 2025-04-23 DIAGNOSIS — M25.611 DECREASED RANGE OF MOTION OF RIGHT SHOULDER: ICD-10-CM

## 2025-04-23 DIAGNOSIS — G89.29 CHRONIC RIGHT SHOULDER PAIN: ICD-10-CM

## 2025-04-23 DIAGNOSIS — M25.511 CHRONIC RIGHT SHOULDER PAIN: ICD-10-CM

## 2025-04-23 DIAGNOSIS — S43.431D SUPERIOR GLENOID LABRUM LESION OF RIGHT SHOULDER, SUBSEQUENT ENCOUNTER: ICD-10-CM

## 2025-04-23 DIAGNOSIS — R29.898 WEAKNESS OF RIGHT SHOULDER: ICD-10-CM

## 2025-04-23 PROCEDURE — 97140 MANUAL THERAPY 1/> REGIONS: CPT | Mod: GP | Performed by: GENERAL ACUTE CARE HOSPITAL

## 2025-04-23 PROCEDURE — 97110 THERAPEUTIC EXERCISES: CPT | Mod: GP | Performed by: GENERAL ACUTE CARE HOSPITAL

## 2025-04-23 NOTE — PROGRESS NOTES
Patient Name: Ron Clarke  MRN: [id number]  Today's Date: 4/23/2025     Current Problem:  1. Superior glenoid labrum lesion of right shoulder, subsequent encounter  Follow Up In Physical Therapy      2. Chronic right shoulder pain  Follow Up In Physical Therapy      3. Decreased range of motion of right shoulder  Follow Up In Physical Therapy      4. Weakness of right shoulder  Follow Up In Physical Therapy          Visit 17/20    Subjective:  Change in pain/ pain level: 3/10  Change in function: throwing is not progressing- tight in the arm and neck afterward, some pain, poor mechanics will increase pain   Patient comments: not compliant with prescribed HEP, doing gym work days per week.     Objective:   ER/IR (R/L)-   7#/20#, 15#26#   CYNDI 180,135,90 (R/L)  3.5#/15.4#/15#, 18#/17#/20.4#   Seated shot- equal   PSET 26 sec/ 39 sec  CKC uSET: 16    Treatment:    Therapeutic exercise (73033):  Access Code: [email or web address]  URL: [email or web address]  Date: 04/23/2025  Prepared by: Neel Alcazar    Exercises  - Standing Shoulder Posterior Capsule Stretch  - 1 x daily - 7 x weekly - 30 hold  - Cervical Retraction with Overpressure  - 2 x daily - 7 x weekly - 10 reps  - Seated Cervical Retraction and Extension  - 2 x daily - 7 x weekly - 10 reps  - Shoulder External Rotation with Anchored Resistance  - 1 x daily - 7 x weekly - 20 reps  - Shoulder Internal Rotation with Resistance  - 1 x daily - 7 x weekly - 20 reps  - Standing Single Arm Shoulder External Rotation in Abduction with Anchored Resistance  - 1 x daily - 7 x weekly - 20 reps  - Isometric Standing Shoulder Internal Rotation - 90 Degrees Abduction  - 1-2 x daily - 7 x weekly - 20 reps  - Prone Shoulder Extension - Single Arm  - 1-2 x daily - 7 x weekly - 20 reps  - Prone Single Arm Shoulder Horizontal Abduction with Scapular Retraction and Palm Down  - 1 x daily - 7 x weekly - 20 reps  - Prone Single Arm Shoulder Y  - 1 x daily - 7 x weekly -  20 reps  - Seated Chair Push Ups  - 1 x daily - 7 x weekly - 20 reps  - Shoulder PNF D2 with Resistance  - 1 x daily - 7 x weekly - 20 reps  - Standing Ball Toss at Wall  - 1 x daily - 7 x weekly - 20 reps  - Side Plank on Knees  - 1 x daily - 7 x weekly - 30 hold  - Standard Plank  - 1 x daily - 7 x weekly - 60 hold  - Push Up on Table  - 1 x daily - 7 x weekly - 20 reps  Manual Therapy (33032):  Glenohumeral mobilization A-P Inferior Lateral grade 3/4 Multiple planes  Stretch elevation, IR, ER, cross body  Scapular mobilization all planes/ manual scapular stabilization  TPR subscapularis, Upper Trapezius, Pectoralis major, Pectoralis minor, latissimus dorsi  Thoracic P-A Rib P-a     Assessment:  Patient continues to require active therapy to progress functional capabilities with decreasing pain levels and improving mechanics with functional activities including progression of Home exercise program. Tolerance to today's treatment was good. Muscle fatigue noted.  Patient appears motivated and compliant this date, demonstrating a working understanding of principals instructed and home program.    Plan:  Progress with functional strength as tolerated with respect to tissue healing. Manual therapy PRN to improve/maintain ROM, prevent adhesion, reduce pain.

## 2025-09-03 ENCOUNTER — OFFICE VISIT (OUTPATIENT)
Dept: ORTHOPEDIC SURGERY | Facility: CLINIC | Age: 45
End: 2025-09-03
Payer: COMMERCIAL

## 2025-09-03 ENCOUNTER — HOSPITAL ENCOUNTER (OUTPATIENT)
Dept: RADIOLOGY | Facility: CLINIC | Age: 45
Discharge: HOME | End: 2025-09-03
Payer: COMMERCIAL

## 2025-09-03 DIAGNOSIS — S46.912A LEFT SHOULDER STRAIN, INITIAL ENCOUNTER: ICD-10-CM

## 2025-09-03 DIAGNOSIS — M25.512 LEFT SHOULDER PAIN, UNSPECIFIED CHRONICITY: ICD-10-CM

## 2025-09-03 DIAGNOSIS — M75.22 BICEPS TENDONITIS ON LEFT: ICD-10-CM

## 2025-09-03 DIAGNOSIS — M77.8 LEFT SHOULDER TENDONITIS: ICD-10-CM

## 2025-09-03 PROCEDURE — 99202 OFFICE O/P NEW SF 15 MIN: CPT | Performed by: FAMILY MEDICINE

## 2025-09-03 PROCEDURE — 73030 X-RAY EXAM OF SHOULDER: CPT | Mod: LEFT SIDE | Performed by: STUDENT IN AN ORGANIZED HEALTH CARE EDUCATION/TRAINING PROGRAM

## 2025-09-03 PROCEDURE — 1036F TOBACCO NON-USER: CPT | Performed by: FAMILY MEDICINE

## 2025-09-03 PROCEDURE — 99214 OFFICE O/P EST MOD 30 MIN: CPT | Performed by: FAMILY MEDICINE

## 2025-09-03 PROCEDURE — 73030 X-RAY EXAM OF SHOULDER: CPT | Mod: LT

## 2025-09-03 RX ORDER — NAPROXEN 500 MG/1
500 TABLET ORAL
Qty: 28 TABLET | Refills: 0 | Status: SHIPPED | OUTPATIENT
Start: 2025-09-03 | End: 2025-09-17

## 2025-09-03 RX ORDER — METHYLPREDNISOLONE 4 MG/1
TABLET ORAL
Qty: 1 EACH | Refills: 0 | Status: SHIPPED | OUTPATIENT
Start: 2025-09-03